# Patient Record
Sex: MALE | Race: WHITE | NOT HISPANIC OR LATINO | Employment: OTHER | URBAN - NONMETROPOLITAN AREA
[De-identification: names, ages, dates, MRNs, and addresses within clinical notes are randomized per-mention and may not be internally consistent; named-entity substitution may affect disease eponyms.]

---

## 2017-06-20 ENCOUNTER — OFFICE VISIT (OUTPATIENT)
Dept: FAMILY MEDICINE CLINIC | Facility: CLINIC | Age: 77
End: 2017-06-20

## 2017-06-20 VITALS
HEART RATE: 52 BPM | BODY MASS INDEX: 23.97 KG/M2 | WEIGHT: 186.8 LBS | SYSTOLIC BLOOD PRESSURE: 140 MMHG | DIASTOLIC BLOOD PRESSURE: 88 MMHG | HEIGHT: 74 IN | TEMPERATURE: 97.9 F | OXYGEN SATURATION: 98 %

## 2017-06-20 DIAGNOSIS — J06.9 VIRAL URI WITH COUGH: ICD-10-CM

## 2017-06-20 DIAGNOSIS — J30.2 SEASONAL ALLERGIC RHINITIS, UNSPECIFIED ALLERGIC RHINITIS TRIGGER: Chronic | ICD-10-CM

## 2017-06-20 DIAGNOSIS — R00.1 SINUS BRADYCARDIA: ICD-10-CM

## 2017-06-20 DIAGNOSIS — J45.21 MILD INTERMITTENT ASTHMA WITH ACUTE EXACERBATION: Primary | ICD-10-CM

## 2017-06-20 PROCEDURE — 96372 THER/PROPH/DIAG INJ SC/IM: CPT | Performed by: INTERNAL MEDICINE

## 2017-06-20 PROCEDURE — 99213 OFFICE O/P EST LOW 20 MIN: CPT | Performed by: INTERNAL MEDICINE

## 2017-06-20 RX ORDER — METHYLPREDNISOLONE ACETATE 80 MG/ML
80 INJECTION, SUSPENSION INTRA-ARTICULAR; INTRALESIONAL; INTRAMUSCULAR; SOFT TISSUE ONCE
Status: COMPLETED | OUTPATIENT
Start: 2017-06-20 | End: 2017-06-20

## 2017-06-20 RX ORDER — TRIAMCINOLONE ACETONIDE 40 MG/ML
10 INJECTION, SUSPENSION INTRA-ARTICULAR; INTRAMUSCULAR ONCE
Status: COMPLETED | OUTPATIENT
Start: 2017-06-20 | End: 2017-06-20

## 2017-06-20 RX ADMIN — TRIAMCINOLONE ACETONIDE 10 MG: 40 INJECTION, SUSPENSION INTRA-ARTICULAR; INTRAMUSCULAR at 15:35

## 2017-06-20 RX ADMIN — METHYLPREDNISOLONE ACETATE 80 MG: 80 INJECTION, SUSPENSION INTRA-ARTICULAR; INTRALESIONAL; INTRAMUSCULAR; SOFT TISSUE at 15:36

## 2017-06-20 NOTE — PROGRESS NOTES
Subjective       History of Present Illness     Corey Maki is a 77 y.o. male here today reporting over a 2-month history of seasonal allergies including postnasal drip and clear nasal discharge.  He then developed a sore throat and nonproductive cough approximately 1 week ago.  He also reports some right-sided nasal congestion, which started today.  The cough is not keeping him awake at night.  He denies paroxysm of cough.  He does report feeling a little tight with deep inspiration and exam today reveals diminished excursion on expiratory phase of cough, for which he is given a sample of Breo.  He denies ear pain.  He took some Clearamist initially, but is not currently taking anything.  He denies sick contacts he is aware of.       He reports he had an EKG in [place] and the doctor sent him to the ER where he was told he was having bradycardia with pulse rate was 30.  Sounds like he might have been experiencing some bigeminy.  He is physically active and his baseline heart rate is in the 50s.  He wore a Holter monitor, which he reports revealed evidence of bigeminy. He was prescribed blood pressure medication, for which he took it for a while, but then discontinued it.  He is assured blood pressure today is not significantly above goal.            Review of Systems   Constitutional: Negative for chills, fatigue and fever.   HENT: Positive for congestion, postnasal drip and sore throat. Negative for ear pain and sinus pressure.    Respiratory: Positive for cough and wheezing. Negative for shortness of breath.    Cardiovascular: Negative for chest pain, palpitations and leg swelling.   Gastrointestinal: Negative for abdominal pain, blood in stool, constipation, diarrhea, nausea and vomiting.   Endocrine: Negative for cold intolerance, heat intolerance, polydipsia and polyuria.   Genitourinary: Negative for dysuria, frequency, hematuria and urgency.   Skin: Negative for rash.   Neurological: Negative for syncope  "and weakness.      PHQ-9 Depression Screening 6/20/2017   Little interest or pleasure in doing things 0   Feeling down, depressed, or hopeless 0   Trouble falling or staying asleep, or sleeping too much 0   Feeling tired or having little energy 0   Poor appetite or overeating 0   Feeling bad about yourself - or that you are a failure or have let yourself or your family down 0   Trouble concentrating on things, such as reading the newspaper or watching television 0   Moving or speaking so slowly that other people could have noticed. Or the opposite - being so fidgety or restless that you have been moving around a lot more than usual 0   Thoughts that you would be better off dead, or of hurting yourself in some way 0   PHQ-9 Total Score 0   If you checked off any problems, how difficult have these problems made it for you to do your work, take care of things at home, or get along with other people? Not difficult at all         Objective     Vitals:    06/20/17 1456   BP: 140/88   Pulse: 52   Temp: 97.9 °F (36.6 °C)   TempSrc: Oral   SpO2: 98%   Weight: 186 lb 12.8 oz (84.7 kg)   Height: 74\" (188 cm)       Physical Exam   Constitutional: He is oriented to person, place, and time. He appears well-developed and well-nourished. No distress.   HENT:   Head: Normocephalic and atraumatic.   Nose: Nose normal.   Mouth/Throat: Oropharynx is clear and moist. No oropharyngeal exudate.   Lots of clear postnasal drip and mild erythema of posterior oropharynx. Nasal congestion, right greater than left.    Eyes: EOM are normal. Pupils are equal, round, and reactive to light.   Neck: Neck supple. No JVD present. No thyromegaly present.   Cardiovascular: Normal rate, regular rhythm and normal heart sounds.    Pulmonary/Chest: Effort normal and breath sounds normal. No accessory muscle usage. No respiratory distress. He has no wheezes. He has no rales.   Diminished excursion on expiratory phase of cough.    Abdominal: Soft. Bowel " sounds are normal. He exhibits no distension. There is no tenderness.   Musculoskeletal: He exhibits no edema.   Lymphadenopathy:     He has no cervical adenopathy.   Neurological: He is alert and oriented to person, place, and time. No cranial nerve deficit.   Psychiatric: He has a normal mood and affect. His speech is normal and behavior is normal.     Future Appointments  Date Time Provider Department Center   12/27/2017 9:00 AM Keegan Sainz MD [facility] None         Assessment/Plan      Recommended OTC Claritin (loratadine) 10 mg daily for the seasonal allergy symptoms and phenylephrine 10 mg to take one in the morning.    He is given a sample of Breo inhaler to use one inhalation daily.      After informed verbal consent, patient is given Kenalog 10 mg and Depo-Medrol 80 mg IM without difficulty or complications.  Patient tolerated well without complications.       He will return in December for next scheduled follow up with fasting labs one week prior.     Scribed for Dr. Sainz by Lorraine Patel [facility].     Diagnoses and all orders for this visit:    Mild intermittent asthma with acute exacerbation  -     triamcinolone acetonide (KENALOG-40) injection 10 mg; Inject 0.25 mL into the shoulder, thigh, or buttocks 1 (One) Time.  -     methylPREDNISolone acetate (DEPO-medrol) injection 80 mg; Inject 1 mL into the shoulder, thigh, or buttocks 1 (One) Time.    Seasonal allergic rhinitis, unspecified allergic rhinitis trigger  -     triamcinolone acetonide (KENALOG-40) injection 10 mg; Inject 0.25 mL into the shoulder, thigh, or buttocks 1 (One) Time.  -     methylPREDNISolone acetate (DEPO-medrol) injection 80 mg; Inject 1 mL into the shoulder, thigh, or buttocks 1 (One) Time.    Viral URI with cough  -     triamcinolone acetonide (KENALOG-40) injection 10 mg; Inject 0.25 mL into the shoulder, thigh, or buttocks 1 (One) Time.  -     methylPREDNISolone acetate (DEPO-medrol) injection 80 mg; Inject 1 mL into the  shoulder, thigh, or buttocks 1 (One) Time.    Sinus bradycardia    Other orders  -     Cholecalciferol (VITAMIN D3) 5000 UNITS capsule capsule; Take 5,000 Units by mouth Daily.      No visits with results within 3 Week(s) from this visit.  Latest known visit with results is:    Hospital Outpatient Visit on 12/20/2016   Component Date Value Ref Range Status   • 25 Hydroxy, Vitamin D 12/20/2016 35.1  30.0 - 100.0 ng/ml Final    Comment: INTERPRETIVE INFORMATION:  Deficient...................<20 ng/ml  Insufficient..........20-<30 ng/ml  Sufficient.............. ng/ml  Potiential Toxicity.....100 ng/ml       • PSA 12/20/2016 0.30  0.00 - 4.00 ng/ml Final    Comment: The lowest detectable amount distinguishable from 0.00 for PSA is 0.06  ng/mL.     ]

## 2017-12-18 DIAGNOSIS — M81.0 OSTEOPOROSIS, UNSPECIFIED OSTEOPOROSIS TYPE, UNSPECIFIED PATHOLOGICAL FRACTURE PRESENCE: ICD-10-CM

## 2017-12-18 DIAGNOSIS — Z12.5 SPECIAL SCREENING FOR MALIGNANT NEOPLASM OF PROSTATE: ICD-10-CM

## 2017-12-18 DIAGNOSIS — E78.5 HYPERLIPIDEMIA, UNSPECIFIED HYPERLIPIDEMIA TYPE: Primary | ICD-10-CM

## 2017-12-18 DIAGNOSIS — D69.6 DECREASED PLATELET COUNT (HCC): ICD-10-CM

## 2017-12-20 ENCOUNTER — LAB (OUTPATIENT)
Dept: LAB | Facility: OTHER | Age: 77
End: 2017-12-20

## 2017-12-20 DIAGNOSIS — E78.5 HYPERLIPIDEMIA, UNSPECIFIED HYPERLIPIDEMIA TYPE: ICD-10-CM

## 2017-12-20 DIAGNOSIS — M81.0 OSTEOPOROSIS, UNSPECIFIED OSTEOPOROSIS TYPE, UNSPECIFIED PATHOLOGICAL FRACTURE PRESENCE: Primary | ICD-10-CM

## 2017-12-20 DIAGNOSIS — M81.0 OSTEOPOROSIS, UNSPECIFIED OSTEOPOROSIS TYPE, UNSPECIFIED PATHOLOGICAL FRACTURE PRESENCE: ICD-10-CM

## 2017-12-20 DIAGNOSIS — D69.6 DECREASED PLATELET COUNT (HCC): ICD-10-CM

## 2017-12-20 DIAGNOSIS — Z12.5 SPECIAL SCREENING FOR MALIGNANT NEOPLASM OF PROSTATE: ICD-10-CM

## 2017-12-20 LAB
25(OH)D3 SERPL-MCNC: 44.4 NG/ML (ref 30–100)
ALBUMIN SERPL-MCNC: 4.1 G/DL (ref 3.2–5.5)
ALBUMIN/GLOB SERPL: 1.2 G/DL (ref 1–3)
ALP SERPL-CCNC: 74 U/L (ref 15–121)
ALT SERPL W P-5'-P-CCNC: 19 U/L (ref 10–60)
ANION GAP SERPL CALCULATED.3IONS-SCNC: 8 MMOL/L (ref 5–15)
AST SERPL-CCNC: 29 U/L (ref 10–60)
BASOPHILS # BLD AUTO: 0.02 10*3/MM3 (ref 0–0.2)
BASOPHILS NFR BLD AUTO: 0.3 % (ref 0–2)
BILIRUB SERPL-MCNC: 1.1 MG/DL (ref 0.2–1)
BUN BLD-MCNC: 24 MG/DL (ref 8–25)
BUN/CREAT SERPL: 21.8 (ref 7–25)
CALCIUM SPEC-SCNC: 9.6 MG/DL (ref 8.4–10.8)
CHLORIDE SERPL-SCNC: 102 MMOL/L (ref 100–112)
CHOLEST SERPL-MCNC: 179 MG/DL (ref 150–200)
CO2 SERPL-SCNC: 31 MMOL/L (ref 20–32)
CREAT BLD-MCNC: 1.1 MG/DL (ref 0.4–1.3)
DEPRECATED RDW RBC AUTO: 43 FL (ref 35.1–43.9)
EOSINOPHIL # BLD AUTO: 0.18 10*3/MM3 (ref 0–0.7)
EOSINOPHIL NFR BLD AUTO: 2.6 % (ref 0–7)
ERYTHROCYTE [DISTWIDTH] IN BLOOD BY AUTOMATED COUNT: 13.2 % (ref 11.5–14.5)
GFR SERPL CREATININE-BSD FRML MDRD: 65 ML/MIN/1.73 (ref 42–98)
GLOBULIN UR ELPH-MCNC: 3.5 GM/DL (ref 2.5–4.6)
GLUCOSE BLD-MCNC: 98 MG/DL (ref 70–100)
HCT VFR BLD AUTO: 43.2 % (ref 39–49)
HDLC SERPL-MCNC: 50 MG/DL (ref 35–100)
HGB BLD-MCNC: 14.5 G/DL (ref 13.7–17.3)
LDLC SERPL CALC-MCNC: 116 MG/DL
LDLC/HDLC SERPL: 2.33 {RATIO}
LYMPHOCYTES # BLD AUTO: 2.46 10*3/MM3 (ref 0.6–4.2)
LYMPHOCYTES NFR BLD AUTO: 35.4 % (ref 10–50)
MCH RBC QN AUTO: 30.5 PG (ref 26.5–34)
MCHC RBC AUTO-ENTMCNC: 33.6 G/DL (ref 31.5–36.3)
MCV RBC AUTO: 90.8 FL (ref 80–98)
MONOCYTES # BLD AUTO: 0.54 10*3/MM3 (ref 0–0.9)
MONOCYTES NFR BLD AUTO: 7.8 % (ref 0–12)
NEUTROPHILS # BLD AUTO: 3.75 10*3/MM3 (ref 2–8.6)
NEUTROPHILS NFR BLD AUTO: 53.9 % (ref 37–80)
PLATELET # BLD AUTO: 147 10*3/MM3 (ref 150–450)
PMV BLD AUTO: 12.3 FL (ref 8–12)
POTASSIUM BLD-SCNC: 3.8 MMOL/L (ref 3.4–5.4)
PROT SERPL-MCNC: 7.6 G/DL (ref 6.7–8.2)
PSA SERPL-MCNC: 0.37 NG/ML (ref 0–4)
RBC # BLD AUTO: 4.76 10*6/MM3 (ref 4.37–5.74)
SODIUM BLD-SCNC: 141 MMOL/L (ref 134–146)
TRIGL SERPL-MCNC: 63 MG/DL (ref 35–160)
VLDLC SERPL-MCNC: 12.6 MG/DL
WBC NRBC COR # BLD: 6.95 10*3/MM3 (ref 3.2–9.8)

## 2017-12-20 PROCEDURE — 84153 ASSAY OF PSA TOTAL: CPT | Performed by: INTERNAL MEDICINE

## 2017-12-20 PROCEDURE — 82306 VITAMIN D 25 HYDROXY: CPT | Performed by: INTERNAL MEDICINE

## 2017-12-20 PROCEDURE — 36415 COLL VENOUS BLD VENIPUNCTURE: CPT | Performed by: INTERNAL MEDICINE

## 2017-12-20 PROCEDURE — 85025 COMPLETE CBC W/AUTO DIFF WBC: CPT | Performed by: INTERNAL MEDICINE

## 2017-12-20 PROCEDURE — 80053 COMPREHEN METABOLIC PANEL: CPT | Performed by: INTERNAL MEDICINE

## 2017-12-20 PROCEDURE — 80061 LIPID PANEL: CPT | Performed by: INTERNAL MEDICINE

## 2017-12-27 ENCOUNTER — OFFICE VISIT (OUTPATIENT)
Dept: FAMILY MEDICINE CLINIC | Facility: CLINIC | Age: 77
End: 2017-12-27

## 2017-12-27 VITALS
HEART RATE: 60 BPM | SYSTOLIC BLOOD PRESSURE: 128 MMHG | BODY MASS INDEX: 24.13 KG/M2 | WEIGHT: 188 LBS | TEMPERATURE: 97.6 F | HEIGHT: 74 IN | DIASTOLIC BLOOD PRESSURE: 86 MMHG

## 2017-12-27 DIAGNOSIS — Z12.5 SPECIAL SCREENING FOR MALIGNANT NEOPLASM OF PROSTATE: ICD-10-CM

## 2017-12-27 DIAGNOSIS — Z12.5 SCREENING FOR MALIGNANT NEOPLASM OF PROSTATE: Chronic | ICD-10-CM

## 2017-12-27 DIAGNOSIS — M81.0 AGE-RELATED OSTEOPOROSIS WITHOUT CURRENT PATHOLOGICAL FRACTURE: Primary | Chronic | ICD-10-CM

## 2017-12-27 DIAGNOSIS — D69.6 DECREASED PLATELET COUNT (HCC): Chronic | ICD-10-CM

## 2017-12-27 DIAGNOSIS — J06.9 ACUTE URI: ICD-10-CM

## 2017-12-27 DIAGNOSIS — K21.9 GASTROESOPHAGEAL REFLUX DISEASE WITHOUT ESOPHAGITIS: Chronic | ICD-10-CM

## 2017-12-27 DIAGNOSIS — E78.2 MIXED HYPERLIPIDEMIA: Chronic | ICD-10-CM

## 2017-12-27 PROBLEM — G25.2 RESTING TREMOR: Status: ACTIVE | Noted: 2017-12-27

## 2017-12-27 PROCEDURE — 99214 OFFICE O/P EST MOD 30 MIN: CPT | Performed by: INTERNAL MEDICINE

## 2017-12-27 RX ORDER — AZITHROMYCIN 250 MG/1
TABLET, FILM COATED ORAL
Qty: 6 TABLET | Refills: 0 | Status: SHIPPED | OUTPATIENT
Start: 2017-12-27 | End: 2018-03-22

## 2017-12-27 RX ORDER — AMLODIPINE BESYLATE 5 MG/1
5 TABLET ORAL 2 TIMES DAILY
Refills: 1 | COMMUNITY
Start: 2017-11-29 | End: 2021-01-18 | Stop reason: SDUPTHER

## 2017-12-27 RX ORDER — HYDROCHLOROTHIAZIDE 12.5 MG/1
1 TABLET ORAL DAILY
Refills: 5 | COMMUNITY
Start: 2017-12-11 | End: 2018-12-19 | Stop reason: ALTCHOICE

## 2017-12-27 RX ORDER — ALENDRONATE SODIUM 70 MG/1
70 TABLET ORAL
Qty: 4 TABLET | Refills: 12 | Status: SHIPPED | OUTPATIENT
Start: 2017-12-27 | End: 2021-01-04 | Stop reason: SINTOL

## 2017-12-27 RX ORDER — OMEPRAZOLE 40 MG/1
1 CAPSULE, DELAYED RELEASE ORAL DAILY PRN
Refills: 2 | COMMUNITY
Start: 2017-11-05 | End: 2018-03-22

## 2017-12-27 NOTE — PROGRESS NOTES
Subjective        History of Present Illness     Corey Maki is a 77 y.o. male here for annual follow up on GERD, hyperlipidemia, and osteoporosis among other issues.  He resides in [place] most of the year. Repeat DEXA 12/2017 revealed increased density of 8% in lumbar spine from prior DEXA 07/2015 with persistent osteoporosis with a decrease in density of the femoral neck from prior study.  He stopped taking Fosamax in the past due to concerns of side effects.  He continues with dietary calcium and vitamin D supplements.  I encouraged him to restart the Fosamax and he agrees to this today.  He has not been taking his Prilosec, but I recommended he premedicate with Prilosec prior to taking his weekly Fosamax.          He reports upper respiratory symptoms for the past four days.  He is having an episodic nonproductive cough productive of white sputum and head congestion with thick, yellow nasal discharge as well as ear popping and pressure and scratchy throat.  He has taken NyQuil and decongestants for symptoms.  The decongestants make him a little anxious. He denies sick contacts he is aware of.          He continues to report an episodic, positional resting tremor of his bilateral wrists/hands, left more than right.  It appeared to have developed as a complication of a prior left wrist injury.   Exam reveals no cogwheeling rigidity.           He was seen by electrophysiologist in [facility] after being seen in a walk in clinic for palpitations.  His blood pressure is at goal today.      Weight is up 4 pounds in the past year. He continues with weight bearing exercise daily with walking 4-5 miles on the days he does not ride his bike when the weather doesn't allow.    The patient's relevant past medical, surgical, and social history was reviewed in Epic.   Lab results are reviewed with the patient today. CBC unremarkable. Platelets are improved at 147,000.   Calcium levels at goal.  Total cholesterol  "incresed to 179.  However, his ratio remains at goal. HDL 50.  .  Triglycerides 63.    Vitamin D at goal with current daily supplement.   PSA normal.  Liver and renal function normal.       Review of Systems   Constitutional: Negative for chills, fatigue and fever.   HENT: Positive for congestion and ear pain. Negative for sinus pressure and sore throat.    Respiratory: Positive for cough. Negative for shortness of breath and wheezing.    Cardiovascular: Negative for chest pain, palpitations and leg swelling.   Gastrointestinal: Negative for abdominal pain, blood in stool, constipation, diarrhea, nausea and vomiting.   Endocrine: Negative for cold intolerance, heat intolerance, polydipsia and polyuria.   Genitourinary: Negative for dysuria, frequency, hematuria and urgency.   Skin: Negative for rash.   Neurological: Negative for syncope and weakness.        Objective     Vitals:    12/27/17 0851   BP: 128/86   Pulse: 60   Temp: 97.6 °F (36.4 °C)   TempSrc: Oral   Weight: 85.3 kg (188 lb)   Height: 188 cm (74\")     Physical Exam   Constitutional: He is oriented to person, place, and time. He appears well-developed and well-nourished. No distress.   Very pleasant gentleman.  He is very fit.  He appears younger than his stated age.    HENT:   Head: Normocephalic and atraumatic.   Nose: Right sinus exhibits no maxillary sinus tenderness and no frontal sinus tenderness. Left sinus exhibits no maxillary sinus tenderness and no frontal sinus tenderness.   Mouth/Throat: Uvula is midline, oropharynx is clear and moist and mucous membranes are normal. No oral lesions. No tonsillar exudate.   Nasal congestion, but no sinus pain.     Eyes: Conjunctivae and EOM are normal. Pupils are equal, round, and reactive to light.   Neck: Trachea normal. Neck supple. No JVD present. Carotid bruit is not present. No tracheal deviation present. No thyroid mass and no thyromegaly present.   Cardiovascular: Normal rate, regular rhythm " and normal heart sounds.   No extrasystoles are present. PMI is not displaced.    No murmur heard.  Pulmonary/Chest: Effort normal and breath sounds normal. No accessory muscle usage. No respiratory distress. He has no decreased breath sounds. He has no wheezes. He has no rhonchi. He has no rales.   Abdominal: Soft. Bowel sounds are normal. He exhibits no distension. There is no hepatosplenomegaly. There is no tenderness.       Vascular Status -  His exam exhibits right foot vasculature normal. His exam exhibits no right foot edema. His exam exhibits left foot vasculature normal. His exam exhibits no left foot edema.  Lymphadenopathy:     He has no cervical adenopathy.   Neurological: He is alert and oriented to person, place, and time. No cranial nerve deficit. Coordination normal.   He has a resting tremor of bilateral upper extremities, but no cogwheeling rigidity.      Skin: Skin is warm, dry and intact. No rash noted. No cyanosis. Nails show no clubbing.   Psychiatric: He has a normal mood and affect. His speech is normal and behavior is normal. Thought content normal.   Vitals reviewed.      Assessment/Plan       Mucinex D to take 1/2 tablet each morning and Z-pack as directed.      Restart Fosamax 70 mg weekly.  I encouraged him to premedicate with Prilosec the evening prior.   We will repeat a DEXA in two years for age related osteoporosis.  Continue with regular weight bearing exercise, dietary calcium, and daily vitamin D supplement.     Continue other medications and vitamin and mineral supplements to treat additional medical problems which we addressed today.  He will return in one year for annual exam with fasting labs one week prior.      Scribed for Dr. Sainz by Lorraine Patel [facility].     Diagnoses and all orders for this visit:    Age-related osteoporosis without current pathological fracture  -     Vitamin D 25 Hydroxy; Future    Decreased platelet count    Screening for malignant neoplasm of  prostate    Mixed hyperlipidemia  -     CBC Auto Differential; Future  -     Comprehensive Metabolic Panel; Future  -     Lipid Panel; Future  -     Vitamin D 25 Hydroxy; Future  -     PSA Screen; Future  -     TSH; Future    Gastroesophageal reflux disease without esophagitis    Special screening for malignant neoplasm of prostate  -     PSA Screen; Future    Acute URI    Other orders  -     amLODIPine (NORVASC) 5 MG tablet; Take 1 tablet by mouth Daily.  -     hydrochlorothiazide (HYDRODIURIL) 12.5 MG tablet; Take 1 tablet by mouth Daily.  -     omeprazole (priLOSEC) 40 MG capsule; Take 1 capsule by mouth Daily As Needed.  -     alendronate (FOSAMAX) 70 MG tablet; Take 1 tablet by mouth Every 7 (Seven) Days.  -     azithromycin (ZITHROMAX) 250 MG tablet; Take 2 tablets the first day, then 1 tablet daily for 4 days.        Lab on 12/20/2017   Component Date Value Ref Range Status   • Glucose 12/20/2017 98  70 - 100 mg/dL Final   • BUN 12/20/2017 24  8 - 25 mg/dL Final   • Creatinine 12/20/2017 1.10  0.40 - 1.30 mg/dL Final   • Sodium 12/20/2017 141  134 - 146 mmol/L Final   • Potassium 12/20/2017 3.8  3.4 - 5.4 mmol/L Final   • Chloride 12/20/2017 102  100 - 112 mmol/L Final   • CO2 12/20/2017 31.0  20.0 - 32.0 mmol/L Final   • Calcium 12/20/2017 9.6  8.4 - 10.8 mg/dL Final   • Total Protein 12/20/2017 7.6  6.7 - 8.2 g/dL Final   • Albumin 12/20/2017 4.10  3.20 - 5.50 g/dL Final   • ALT (SGPT) 12/20/2017 19  10 - 60 U/L Final   • AST (SGOT) 12/20/2017 29  10 - 60 U/L Final   • Alkaline Phosphatase 12/20/2017 74  15 - 121 U/L Final   • Total Bilirubin 12/20/2017 1.1* 0.2 - 1.0 mg/dL Final   • eGFR Non  Amer 12/20/2017 65  42 - 98 mL/min/1.73 Final   • Globulin 12/20/2017 3.5  2.5 - 4.6 gm/dL Final   • A/G Ratio 12/20/2017 1.2  1.0 - 3.0 g/dL Final   • BUN/Creatinine Ratio 12/20/2017 21.8  7.0 - 25.0 Final   • Anion Gap 12/20/2017 8.0  5.0 - 15.0 mmol/L Final   • Total Cholesterol 12/20/2017 179  150 - 200  mg/dL Final   • Triglycerides 12/20/2017 63  35 - 160 mg/dL Final   • HDL Cholesterol 12/20/2017 50  35 - 100 mg/dL Final   • LDL Cholesterol  12/20/2017 116  mg/dL Final   • VLDL Cholesterol 12/20/2017 12.6  mg/dL Final   • LDL/HDL Ratio 12/20/2017 2.33   Final   • PSA 12/20/2017 0.372  0.000 - 4.000 ng/mL Final   • 25 Hydroxy, Vitamin D 12/20/2017 44.4  30.0 - 100.0 ng/ml Final   • WBC 12/20/2017 6.95  3.20 - 9.80 10*3/mm3 Final   • RBC 12/20/2017 4.76  4.37 - 5.74 10*6/mm3 Final   • Hemoglobin 12/20/2017 14.5  13.7 - 17.3 g/dL Final   • Hematocrit 12/20/2017 43.2  39.0 - 49.0 % Final   • MCV 12/20/2017 90.8  80.0 - 98.0 fL Final   • MCH 12/20/2017 30.5  26.5 - 34.0 pg Final   • MCHC 12/20/2017 33.6  31.5 - 36.3 g/dL Final   • RDW 12/20/2017 13.2  11.5 - 14.5 % Final   • RDW-SD 12/20/2017 43.0  35.1 - 43.9 fl Final   • MPV 12/20/2017 12.3* 8.0 - 12.0 fL Final   • Platelets 12/20/2017 147* 150 - 450 10*3/mm3 Final   • Neutrophil % 12/20/2017 53.9  37.0 - 80.0 % Final   • Lymphocyte % 12/20/2017 35.4  10.0 - 50.0 % Final   • Monocyte % 12/20/2017 7.8  0.0 - 12.0 % Final   • Eosinophil % 12/20/2017 2.6  0.0 - 7.0 % Final   • Basophil % 12/20/2017 0.3  0.0 - 2.0 % Final   • Neutrophils, Absolute 12/20/2017 3.75  2.00 - 8.60 10*3/mm3 Final   • Lymphocytes, Absolute 12/20/2017 2.46  0.60 - 4.20 10*3/mm3 Final   • Monocytes, Absolute 12/20/2017 0.54  0.00 - 0.90 10*3/mm3 Final   • Eosinophils, Absolute 12/20/2017 0.18  0.00 - 0.70 10*3/mm3 Final   • Basophils, Absolute 12/20/2017 0.02  0.00 - 0.20 10*3/mm3 Final   ]

## 2018-03-22 ENCOUNTER — OFFICE VISIT (OUTPATIENT)
Dept: FAMILY MEDICINE CLINIC | Facility: CLINIC | Age: 78
End: 2018-03-22

## 2018-03-22 VITALS
OXYGEN SATURATION: 98 % | HEIGHT: 74 IN | DIASTOLIC BLOOD PRESSURE: 78 MMHG | WEIGHT: 182 LBS | BODY MASS INDEX: 23.36 KG/M2 | SYSTOLIC BLOOD PRESSURE: 120 MMHG | TEMPERATURE: 97.7 F | HEART RATE: 55 BPM

## 2018-03-22 DIAGNOSIS — R05.9 COUGH: Primary | ICD-10-CM

## 2018-03-22 DIAGNOSIS — J06.9 VIRAL URI WITH COUGH: ICD-10-CM

## 2018-03-22 DIAGNOSIS — I10 ESSENTIAL HYPERTENSION: Chronic | ICD-10-CM

## 2018-03-22 DIAGNOSIS — M81.0 AGE-RELATED OSTEOPOROSIS WITHOUT CURRENT PATHOLOGICAL FRACTURE: Chronic | ICD-10-CM

## 2018-03-22 DIAGNOSIS — J06.9 ACUTE URI: ICD-10-CM

## 2018-03-22 DIAGNOSIS — J30.1 ACUTE SEASONAL ALLERGIC RHINITIS DUE TO POLLEN: ICD-10-CM

## 2018-03-22 LAB
FLUAV AG NPH QL: NEGATIVE
FLUBV AG NPH QL IA: NEGATIVE

## 2018-03-22 PROCEDURE — 87804 INFLUENZA ASSAY W/OPTIC: CPT | Performed by: INTERNAL MEDICINE

## 2018-03-22 PROCEDURE — 96372 THER/PROPH/DIAG INJ SC/IM: CPT | Performed by: INTERNAL MEDICINE

## 2018-03-22 PROCEDURE — 99213 OFFICE O/P EST LOW 20 MIN: CPT | Performed by: INTERNAL MEDICINE

## 2018-03-22 RX ORDER — METHYLPREDNISOLONE ACETATE 80 MG/ML
80 INJECTION, SUSPENSION INTRA-ARTICULAR; INTRALESIONAL; INTRAMUSCULAR; SOFT TISSUE ONCE
Status: COMPLETED | OUTPATIENT
Start: 2018-03-22 | End: 2018-03-22

## 2018-03-22 RX ORDER — TRIAMCINOLONE ACETONIDE 40 MG/ML
20 INJECTION, SUSPENSION INTRA-ARTICULAR; INTRAMUSCULAR ONCE
Status: COMPLETED | OUTPATIENT
Start: 2018-03-22 | End: 2018-03-22

## 2018-03-22 RX ADMIN — TRIAMCINOLONE ACETONIDE 20 MG: 40 INJECTION, SUSPENSION INTRA-ARTICULAR; INTRAMUSCULAR at 11:59

## 2018-03-22 RX ADMIN — METHYLPREDNISOLONE ACETATE 80 MG: 80 INJECTION, SUSPENSION INTRA-ARTICULAR; INTRALESIONAL; INTRAMUSCULAR; SOFT TISSUE at 11:59

## 2018-03-23 PROBLEM — I10 ESSENTIAL HYPERTENSION: Chronic | Status: ACTIVE | Noted: 2018-03-23

## 2018-03-23 NOTE — PROGRESS NOTES
"Subjective     Corey Maki is a 77 y.o. male.     History of Present Illness     Corey reports a 2 day history of nasal congestion, clear rhinitis, and increased postnasal drip.  He is also experiencing a nonproductive cough with rare wheeze.  He denies any shortness of breath.  He is describing some spring allergy symptoms including sneezing and rhinitis.  Mild to moderate sore throat.  No fevers or chills.  He denies significant headaches or flulike body aches and pains.  Influenza screen is negative today.  No sick contacts.  He has not tried any OTC medicines for her symptoms yet.  He has Zyrtec at home.    His blood pressure continues to be improved with a combination of Norvasc and hydrochlorothiazide.  See last note.    I'm pleased to see that he is being compliant with the Fosamax and calcium/vitamin D supplements to address his osteoporosis.  See last note for more details.    He continues to exercise regularly.  He is mostly walking for exercise now instead of biking.  He walks for approximately 1 hour at least 5 days weekly.  I suggested he consider interval training.    Review of Systems   Constitutional: Negative for chills, fatigue and fever.   HENT: Positive for congestion, postnasal drip, rhinorrhea and sore throat. Negative for ear pain and sinus pressure.    Respiratory: Negative for cough and shortness of breath.    Cardiovascular: Negative for chest pain, palpitations and leg swelling.   Gastrointestinal: Negative for abdominal pain, blood in stool, constipation, diarrhea, nausea and vomiting.   Endocrine: Negative for cold intolerance, heat intolerance, polydipsia and polyuria.   Genitourinary: Negative for dysuria, frequency, hematuria and urgency.   Skin: Negative for rash.   Neurological: Negative for syncope and weakness.       Objective     /78   Pulse 55   Temp 97.7 °F (36.5 °C) (Oral)   Ht 188 cm (74\")   Wt 82.6 kg (182 lb)   SpO2 98%   BMI 23.37 kg/m²     Physical Exam "   Constitutional: He is oriented to person, place, and time. He appears well-developed and well-nourished. No distress.   Very pleasant gentleman.  He is very fit.  He appears younger than his stated age.    HENT:   Head: Normocephalic and atraumatic.   Nose: Right sinus exhibits no maxillary sinus tenderness and no frontal sinus tenderness. Left sinus exhibits no maxillary sinus tenderness and no frontal sinus tenderness.   Mouth/Throat: Uvula is midline, oropharynx is clear and moist and mucous membranes are normal. No oral lesions. No tonsillar exudate.   Nasal congestion, clear postnasal drip.  Mild posterior erythema on the right with no exudate noted.   Eyes: Conjunctivae and EOM are normal. Pupils are equal, round, and reactive to light.   Neck: Trachea normal. Neck supple. No JVD present. Carotid bruit is not present. No tracheal deviation present. No thyroid mass and no thyromegaly present.   Cardiovascular: Normal rate, regular rhythm, normal heart sounds and intact distal pulses.   No extrasystoles are present. PMI is not displaced.    No murmur heard.  Pulmonary/Chest: Effort normal. No accessory muscle usage. No respiratory distress. He has no decreased breath sounds. He has no wheezes. He has no rhonchi. He has no rales.   Just a few rhonchi noted on expiratory phase of cough bilaterally with mildly diminished excursion on expiratory phase of cough   Abdominal: Soft. Bowel sounds are normal. He exhibits no distension. There is no hepatosplenomegaly. There is no tenderness.     Vascular Status -  His right foot exhibits normal right foot vasculature  and normal right foot edema. His left foot exhibits normal left foot vasculature  and normal left foot edema.  Lymphadenopathy:     He has no cervical adenopathy.   Neurological: He is alert and oriented to person, place, and time. No cranial nerve deficit. Coordination normal.   He has a resting tremor of bilateral upper extremities, but no cogwheeling  rigidity.      Skin: Skin is warm, dry and intact. No rash noted. No cyanosis. Nails show no clubbing.   Psychiatric: He has a normal mood and affect. His speech is normal and behavior is normal. Thought content normal.   Vitals reviewed.      PHQ-2/PHQ-9 Depression Screening 12/27/2017   Little interest or pleasure in doing things 0   Feeling down, depressed, or hopeless 0   Trouble falling or staying asleep, or sleeping too much -   Feeling tired or having little energy -   Poor appetite or overeating -   Feeling bad about yourself - or that you are a failure or have let yourself or your family down -   Trouble concentrating on things, such as reading the newspaper or watching television -   Moving or speaking so slowly that other people could have noticed. Or the opposite - being so fidgety or restless that you have been moving around a lot more than usual -   Thoughts that you would be better off dead, or of hurting yourself in some way -   Total Score 0   If you checked off any problems, how difficult have these problems made it for you to do your work, take care of things at home, or get along with other people? -       Assessment/Plan     Z-Hayder as directed.  Resume Zyrtec 10 mg daily at bedtime.  The patient receives injection today with Kenalog 20 mg and Depo-Medrol 80 mg IM without difficulty or complication.    Continue the Norvasc and HCTZ.    Continue the Fosamax and calcium/vitamin D supplement.  Continue the regular weightbearing exercises.    Diagnoses and all orders for this visit:    Cough  -     Influenza Antigen, Rapid - Swab, Nasopharynx  -     triamcinolone acetonide (KENALOG-40) injection 20 mg; Inject 0.5 mL into the shoulder, thigh, or buttocks 1 (One) Time.  -     methylPREDNISolone acetate (DEPO-medrol) injection 80 mg; Inject 1 mL into the shoulder, thigh, or buttocks 1 (One) Time.    Acute URI  -     triamcinolone acetonide (KENALOG-40) injection 20 mg; Inject 0.5 mL into the shoulder,  thigh, or buttocks 1 (One) Time.  -     methylPREDNISolone acetate (DEPO-medrol) injection 80 mg; Inject 1 mL into the shoulder, thigh, or buttocks 1 (One) Time.    Viral URI with cough  -     triamcinolone acetonide (KENALOG-40) injection 20 mg; Inject 0.5 mL into the shoulder, thigh, or buttocks 1 (One) Time.  -     methylPREDNISolone acetate (DEPO-medrol) injection 80 mg; Inject 1 mL into the shoulder, thigh, or buttocks 1 (One) Time.    Acute seasonal allergic rhinitis due to pollen  -     triamcinolone acetonide (KENALOG-40) injection 20 mg; Inject 0.5 mL into the shoulder, thigh, or buttocks 1 (One) Time.  -     methylPREDNISolone acetate (DEPO-medrol) injection 80 mg; Inject 1 mL into the shoulder, thigh, or buttocks 1 (One) Time.    Age-related osteoporosis without current pathological fracture - started Fosamax 12/2017    Essential hypertension        No visits with results within 3 Week(s) from this visit.   Latest known visit with results is:   Lab on 12/20/2017   Component Date Value Ref Range Status   • Glucose 12/20/2017 98  70 - 100 mg/dL Final   • BUN 12/20/2017 24  8 - 25 mg/dL Final   • Creatinine 12/20/2017 1.10  0.40 - 1.30 mg/dL Final   • Sodium 12/20/2017 141  134 - 146 mmol/L Final   • Potassium 12/20/2017 3.8  3.4 - 5.4 mmol/L Final   • Chloride 12/20/2017 102  100 - 112 mmol/L Final   • CO2 12/20/2017 31.0  20.0 - 32.0 mmol/L Final   • Calcium 12/20/2017 9.6  8.4 - 10.8 mg/dL Final   • Total Protein 12/20/2017 7.6  6.7 - 8.2 g/dL Final   • Albumin 12/20/2017 4.10  3.20 - 5.50 g/dL Final   • ALT (SGPT) 12/20/2017 19  10 - 60 U/L Final   • AST (SGOT) 12/20/2017 29  10 - 60 U/L Final   • Alkaline Phosphatase 12/20/2017 74  15 - 121 U/L Final   • Total Bilirubin 12/20/2017 1.1* 0.2 - 1.0 mg/dL Final   • eGFR Non  Amer 12/20/2017 65  42 - 98 mL/min/1.73 Final   • Globulin 12/20/2017 3.5  2.5 - 4.6 gm/dL Final   • A/G Ratio 12/20/2017 1.2  1.0 - 3.0 g/dL Final   • BUN/Creatinine Ratio  12/20/2017 21.8  7.0 - 25.0 Final   • Anion Gap 12/20/2017 8.0  5.0 - 15.0 mmol/L Final   • Total Cholesterol 12/20/2017 179  150 - 200 mg/dL Final   • Triglycerides 12/20/2017 63  35 - 160 mg/dL Final   • HDL Cholesterol 12/20/2017 50  35 - 100 mg/dL Final   • LDL Cholesterol  12/20/2017 116  mg/dL Final   • VLDL Cholesterol 12/20/2017 12.6  mg/dL Final   • LDL/HDL Ratio 12/20/2017 2.33   Final   • PSA 12/20/2017 0.372  0.000 - 4.000 ng/mL Final   • 25 Hydroxy, Vitamin D 12/20/2017 44.4  30.0 - 100.0 ng/ml Final   • WBC 12/20/2017 6.95  3.20 - 9.80 10*3/mm3 Final   • RBC 12/20/2017 4.76  4.37 - 5.74 10*6/mm3 Final   • Hemoglobin 12/20/2017 14.5  13.7 - 17.3 g/dL Final   • Hematocrit 12/20/2017 43.2  39.0 - 49.0 % Final   • MCV 12/20/2017 90.8  80.0 - 98.0 fL Final   • MCH 12/20/2017 30.5  26.5 - 34.0 pg Final   • MCHC 12/20/2017 33.6  31.5 - 36.3 g/dL Final   • RDW 12/20/2017 13.2  11.5 - 14.5 % Final   • RDW-SD 12/20/2017 43.0  35.1 - 43.9 fl Final   • MPV 12/20/2017 12.3* 8.0 - 12.0 fL Final   • Platelets 12/20/2017 147* 150 - 450 10*3/mm3 Final   • Neutrophil % 12/20/2017 53.9  37.0 - 80.0 % Final   • Lymphocyte % 12/20/2017 35.4  10.0 - 50.0 % Final   • Monocyte % 12/20/2017 7.8  0.0 - 12.0 % Final   • Eosinophil % 12/20/2017 2.6  0.0 - 7.0 % Final   • Basophil % 12/20/2017 0.3  0.0 - 2.0 % Final   • Neutrophils, Absolute 12/20/2017 3.75  2.00 - 8.60 10*3/mm3 Final   • Lymphocytes, Absolute 12/20/2017 2.46  0.60 - 4.20 10*3/mm3 Final   • Monocytes, Absolute 12/20/2017 0.54  0.00 - 0.90 10*3/mm3 Final   • Eosinophils, Absolute 12/20/2017 0.18  0.00 - 0.70 10*3/mm3 Final   • Basophils, Absolute 12/20/2017 0.02  0.00 - 0.20 10*3/mm3 Final   ]

## 2018-12-19 ENCOUNTER — LAB (OUTPATIENT)
Dept: LAB | Facility: OTHER | Age: 78
End: 2018-12-19

## 2018-12-19 ENCOUNTER — OFFICE VISIT (OUTPATIENT)
Dept: FAMILY MEDICINE CLINIC | Facility: CLINIC | Age: 78
End: 2018-12-19

## 2018-12-19 VITALS
HEIGHT: 74 IN | TEMPERATURE: 97.7 F | SYSTOLIC BLOOD PRESSURE: 138 MMHG | DIASTOLIC BLOOD PRESSURE: 80 MMHG | BODY MASS INDEX: 23.1 KG/M2 | HEART RATE: 52 BPM | WEIGHT: 180 LBS

## 2018-12-19 DIAGNOSIS — K21.9 GASTROESOPHAGEAL REFLUX DISEASE WITHOUT ESOPHAGITIS: Chronic | ICD-10-CM

## 2018-12-19 DIAGNOSIS — Z12.5 SCREENING FOR MALIGNANT NEOPLASM OF PROSTATE: Chronic | ICD-10-CM

## 2018-12-19 DIAGNOSIS — M81.0 AGE-RELATED OSTEOPOROSIS WITHOUT CURRENT PATHOLOGICAL FRACTURE: Chronic | ICD-10-CM

## 2018-12-19 DIAGNOSIS — I10 ESSENTIAL HYPERTENSION: Primary | Chronic | ICD-10-CM

## 2018-12-19 DIAGNOSIS — E78.2 MIXED HYPERLIPIDEMIA: Chronic | ICD-10-CM

## 2018-12-19 DIAGNOSIS — R73.01 IFG (IMPAIRED FASTING GLUCOSE): ICD-10-CM

## 2018-12-19 DIAGNOSIS — J30.2 SEASONAL ALLERGIC RHINITIS, UNSPECIFIED TRIGGER: Chronic | ICD-10-CM

## 2018-12-19 LAB
25(OH)D3 SERPL-MCNC: 64.4 NG/ML (ref 30–100)
ALBUMIN SERPL-MCNC: 5 G/DL (ref 3.5–5)
ALBUMIN/GLOB SERPL: 1.6 G/DL (ref 1.1–1.8)
ALP SERPL-CCNC: 102 U/L (ref 38–126)
ALT SERPL W P-5'-P-CCNC: 18 U/L
ANION GAP SERPL CALCULATED.3IONS-SCNC: 9 MMOL/L (ref 5–15)
AST SERPL-CCNC: 31 U/L (ref 17–59)
BASOPHILS # BLD AUTO: 0.01 10*3/MM3 (ref 0–0.2)
BASOPHILS NFR BLD AUTO: 0.2 % (ref 0–2)
BILIRUB SERPL-MCNC: 1 MG/DL (ref 0.2–1.3)
BUN BLD-MCNC: 18 MG/DL (ref 9–20)
BUN/CREAT SERPL: 15.9 (ref 7–25)
CALCIUM SPEC-SCNC: 10 MG/DL (ref 8.4–10.2)
CHLORIDE SERPL-SCNC: 106 MMOL/L (ref 98–107)
CHOLEST SERPL-MCNC: 199 MG/DL (ref 150–200)
CO2 SERPL-SCNC: 27 MMOL/L (ref 22–30)
CREAT BLD-MCNC: 1.13 MG/DL (ref 0.66–1.25)
DEPRECATED RDW RBC AUTO: 45.5 FL (ref 35.1–43.9)
EOSINOPHIL # BLD AUTO: 0.2 10*3/MM3 (ref 0–0.7)
EOSINOPHIL NFR BLD AUTO: 3.4 % (ref 0–7)
ERYTHROCYTE [DISTWIDTH] IN BLOOD BY AUTOMATED COUNT: 13.9 % (ref 11.5–14.5)
GFR SERPL CREATININE-BSD FRML MDRD: 63 ML/MIN/1.73 (ref 42–98)
GLOBULIN UR ELPH-MCNC: 3.2 GM/DL (ref 2.3–3.5)
GLUCOSE BLD-MCNC: 103 MG/DL (ref 74–99)
HCT VFR BLD AUTO: 45.4 % (ref 39–49)
HDLC SERPL-MCNC: 54 MG/DL (ref 40–59)
HGB BLD-MCNC: 14.9 G/DL (ref 13.7–17.3)
LDLC SERPL CALC-MCNC: 130 MG/DL
LDLC/HDLC SERPL: 2.41 {RATIO} (ref 0–3.55)
LYMPHOCYTES # BLD AUTO: 2.18 10*3/MM3 (ref 0.6–4.2)
LYMPHOCYTES NFR BLD AUTO: 36.8 % (ref 10–50)
MCH RBC QN AUTO: 30 PG (ref 26.5–34)
MCHC RBC AUTO-ENTMCNC: 32.8 G/DL (ref 31.5–36.3)
MCV RBC AUTO: 91.3 FL (ref 80–98)
MONOCYTES # BLD AUTO: 0.45 10*3/MM3 (ref 0–0.9)
MONOCYTES NFR BLD AUTO: 7.6 % (ref 0–12)
NEUTROPHILS # BLD AUTO: 3.08 10*3/MM3 (ref 2–8.6)
NEUTROPHILS NFR BLD AUTO: 52 % (ref 37–80)
PLATELET # BLD AUTO: 164 10*3/MM3 (ref 150–450)
PMV BLD AUTO: 12.3 FL (ref 8–12)
POTASSIUM BLD-SCNC: 4.2 MMOL/L (ref 3.4–5)
PROT SERPL-MCNC: 8.2 G/DL (ref 6.3–8.2)
RBC # BLD AUTO: 4.97 10*6/MM3 (ref 4.37–5.74)
SODIUM BLD-SCNC: 142 MMOL/L (ref 137–145)
TRIGL SERPL-MCNC: 73 MG/DL
TSH SERPL DL<=0.05 MIU/L-ACNC: 1.71 MIU/ML (ref 0.46–4.68)
VLDLC SERPL-MCNC: 14.6 MG/DL
WBC NRBC COR # BLD: 5.92 10*3/MM3 (ref 3.2–9.8)

## 2018-12-19 PROCEDURE — 85025 COMPLETE CBC W/AUTO DIFF WBC: CPT | Performed by: INTERNAL MEDICINE

## 2018-12-19 PROCEDURE — 82306 VITAMIN D 25 HYDROXY: CPT | Performed by: INTERNAL MEDICINE

## 2018-12-19 PROCEDURE — 99214 OFFICE O/P EST MOD 30 MIN: CPT | Performed by: INTERNAL MEDICINE

## 2018-12-19 PROCEDURE — 36415 COLL VENOUS BLD VENIPUNCTURE: CPT | Performed by: INTERNAL MEDICINE

## 2018-12-19 PROCEDURE — 80053 COMPREHEN METABOLIC PANEL: CPT | Performed by: INTERNAL MEDICINE

## 2018-12-19 PROCEDURE — 84443 ASSAY THYROID STIM HORMONE: CPT | Performed by: INTERNAL MEDICINE

## 2018-12-19 PROCEDURE — 80061 LIPID PANEL: CPT | Performed by: INTERNAL MEDICINE

## 2018-12-19 NOTE — PATIENT INSTRUCTIONS

## 2018-12-19 NOTE — PROGRESS NOTES
Subjective     History of Present Illness     Corey Maki is a 78 y.o. male who presents for annual follow up on GERD, hyperlipidemia, and osteoporosis among other issues.  He resides in [place] most of the year. His wife had a bad biking accident while exercising, which has left her disabled. He continues with weight bearing exercise daily with walking 4-5 miles on the days he does not ride his bike when the weather doesn't allow.  He had normal stress test done this past summer due to PVCs.  He follows with an electrophysiologist in [place].     He had repeat DEXA 12/2017, which revealed increased density of 8% in lumbar spine from prior DEXA 07/2015 with persistent osteoporosis with a decrease in density of the femoral neck from prior study.  When he was here last year, he had stopped taking Fosamax due to concerns of side effects. He agreed to restart the Fosamax last year, but reports he never restarted the Fosamax.  He continues with calcium and vitamin D supplements.  We will repeat DEXA 12/2019.    He reports bilateral lateral hip pain, left greater than right, which is not aggravated by walking.      His last colonoscopy was in 2007.  He denies history of colon polyps or any bowel changes.      Labs reveal mild impaired fasting glucose, for which I recommended he monitor and moderate intake of sugar and carbohydrates.        He continues to decline  pneumonia and influenza vaccines.     Weight is down 8 pounds in the past year.  Blood pressure is a little higher than his normal, but remains normal range.  He reports blood pressure is consistently at goal at home when he monitors.       It was a day early for insurance to cover annual PSA. He is going to return in a couple of days to have the PSA.  We will notify him with results.  Prostate exam is deferred today.       The patient's relevant past medical, surgical, and social history was reviewed in Epic.  Lab results are reviewed with the patient today.   "CBC unremarkable.  Platelets 164,000. Fasting glucose 103.  Total cholesterol slightly increased at 199, but remains at goal.  HDL 54.  .  Triglycerides 73.  LDL/HDL ratio remains stable at 2.41.   Normal renal and liver function normal.      Review of Systems   Constitutional: Negative for chills, fatigue and fever.   HENT: Negative for congestion, ear pain, postnasal drip, sinus pressure and sore throat.    Respiratory: Negative for cough, shortness of breath and wheezing.    Cardiovascular: Negative for chest pain, palpitations and leg swelling.   Gastrointestinal: Negative for abdominal pain, blood in stool, constipation, diarrhea, nausea and vomiting.   Endocrine: Negative for cold intolerance, heat intolerance, polydipsia and polyuria.   Genitourinary: Negative for dysuria, frequency, hematuria and urgency.   Skin: Negative for rash.   Neurological: Negative for syncope and weakness.        Objective     Vitals:    12/19/18 0845   BP: 138/80   Pulse: 52   Temp: 97.7 °F (36.5 °C)   TempSrc: Oral   Weight: 81.6 kg (180 lb)   Height: 188 cm (74\")     Physical Exam   Constitutional: He is oriented to person, place, and time. He appears well-developed and well-nourished. No distress.   HENT:   Head: Normocephalic and atraumatic.   Nose: Right sinus exhibits no maxillary sinus tenderness and no frontal sinus tenderness. Left sinus exhibits no maxillary sinus tenderness and no frontal sinus tenderness.   Mouth/Throat: Uvula is midline, oropharynx is clear and moist and mucous membranes are normal. No oral lesions. No tonsillar exudate.   Clear postnasal drip.    Eyes: Conjunctivae and EOM are normal. Pupils are equal, round, and reactive to light.   Neck: Trachea normal. Neck supple. No JVD present. Carotid bruit is not present. No tracheal deviation present. No thyroid mass and no thyromegaly present.   Cardiovascular: Normal rate, regular rhythm, normal heart sounds and intact distal pulses.  No " extrasystoles are present. PMI is not displaced.   No murmur heard.  Pulmonary/Chest: Effort normal and breath sounds normal. No accessory muscle usage. No respiratory distress. He has no decreased breath sounds. He has no wheezes. He has no rhonchi. He has no rales.   Abdominal: Soft. Bowel sounds are normal. He exhibits no distension. There is no hepatosplenomegaly. There is no tenderness.     Vascular Status -  His right foot exhibits normal foot vasculature  and no edema. His left foot exhibits normal foot vasculature  and no edema.  Lymphadenopathy:     He has no cervical adenopathy.   Neurological: He is alert and oriented to person, place, and time. No cranial nerve deficit. Coordination normal.   Skin: Skin is warm, dry and intact. No rash noted. No cyanosis. Nails show no clubbing.   Sun damaged skin.    Psychiatric: He has a normal mood and affect. His speech is normal and behavior is normal. Judgment and thought content normal.   Vitals reviewed.          Assessment/Plan      Continue the Norvasc and HCTZ.     Continue the calcium/vitamin D supplement.  Continue the regular weightbearing exercises.  We will plan to repeat DEXA 12/2019.    He declines pneumonia and influenza vaccines.     Monitor and moderate intake of sugar and carbohydrates to delay progression of impaired fasting glucose.       Continue other medications and vitamin and mineral supplements to treat additional medical problems which we addressed today.  Return in one year for annual exam with fasting labs one week prior.      Scribed for Dr. Sainz by Lorraine Patel [facility].     Diagnoses and all orders for this visit:    Essential hypertension    Mixed hyperlipidemia  -     Lipid Panel; Future    Gastroesophageal reflux disease without esophagitis    Seasonal allergic rhinitis, unspecified trigger    Age-related osteoporosis without current pathological fracture  -     Vitamin D 25 Hydroxy; Future    Screening for malignant neoplasm of  prostate  -     PSA Screen; Future    IFG (impaired fasting glucose)  -     CBC Auto Differential; Future  -     Comprehensive Metabolic Panel; Future  -     Hemoglobin A1c; Future        Lab on 12/19/2018   Component Date Value Ref Range Status   • WBC 12/19/2018 5.92  3.20 - 9.80 10*3/mm3 Final   • RBC 12/19/2018 4.97  4.37 - 5.74 10*6/mm3 Final   • Hemoglobin 12/19/2018 14.9  13.7 - 17.3 g/dL Final   • Hematocrit 12/19/2018 45.4  39.0 - 49.0 % Final   • MCV 12/19/2018 91.3  80.0 - 98.0 fL Final   • MCH 12/19/2018 30.0  26.5 - 34.0 pg Final   • MCHC 12/19/2018 32.8  31.5 - 36.3 g/dL Final   • RDW 12/19/2018 13.9  11.5 - 14.5 % Final   • RDW-SD 12/19/2018 45.5* 35.1 - 43.9 fl Final   • MPV 12/19/2018 12.3* 8.0 - 12.0 fL Final   • Platelets 12/19/2018 164  150 - 450 10*3/mm3 Final   • Neutrophil % 12/19/2018 52.0  37.0 - 80.0 % Final   • Lymphocyte % 12/19/2018 36.8  10.0 - 50.0 % Final   • Monocyte % 12/19/2018 7.6  0.0 - 12.0 % Final   • Eosinophil % 12/19/2018 3.4  0.0 - 7.0 % Final   • Basophil % 12/19/2018 0.2  0.0 - 2.0 % Final   • Neutrophils, Absolute 12/19/2018 3.08  2.00 - 8.60 10*3/mm3 Final   • Lymphocytes, Absolute 12/19/2018 2.18  0.60 - 4.20 10*3/mm3 Final   • Monocytes, Absolute 12/19/2018 0.45  0.00 - 0.90 10*3/mm3 Final   • Eosinophils, Absolute 12/19/2018 0.20  0.00 - 0.70 10*3/mm3 Final   • Basophils, Absolute 12/19/2018 0.01  0.00 - 0.20 10*3/mm3 Final   • Glucose 12/19/2018 103* 74 - 99 mg/dL Final   • BUN 12/19/2018 18  9 - 20 mg/dL Final   • Creatinine 12/19/2018 1.13  0.66 - 1.25 mg/dL Final   • Sodium 12/19/2018 142  137 - 145 mmol/L Final   • Potassium 12/19/2018 4.2  3.4 - 5.0 mmol/L Final   • Chloride 12/19/2018 106  98 - 107 mmol/L Final   • CO2 12/19/2018 27.0  22.0 - 30.0 mmol/L Final   • Calcium 12/19/2018 10.0  8.4 - 10.2 mg/dL Final   • Total Protein 12/19/2018 8.2  6.3 - 8.2 g/dL Final   • Albumin 12/19/2018 5.00  3.50 - 5.00 g/dL Final   • ALT (SGPT) 12/19/2018 18  <=50 U/L Final    • AST (SGOT) 12/19/2018 31  17 - 59 U/L Final   • Alkaline Phosphatase 12/19/2018 102  38 - 126 U/L Final   • Total Bilirubin 12/19/2018 1.0  0.2 - 1.3 mg/dL Final   • eGFR Non African Amer 12/19/2018 63  42 - 98 mL/min/1.73 Final   • Globulin 12/19/2018 3.2  2.3 - 3.5 gm/dL Final   • A/G Ratio 12/19/2018 1.6  1.1 - 1.8 g/dL Final   • BUN/Creatinine Ratio 12/19/2018 15.9  7.0 - 25.0 Final   • Anion Gap 12/19/2018 9.0  5.0 - 15.0 mmol/L Final   • Total Cholesterol 12/19/2018 199  150 - 200 mg/dL Final   • Triglycerides 12/19/2018 73  <=150 mg/dL Final   • HDL Cholesterol 12/19/2018 54  40 - 59 mg/dL Final   • LDL Cholesterol  12/19/2018 130* <=100 mg/dL Final   • VLDL Cholesterol 12/19/2018 14.6  mg/dL Final   • LDL/HDL Ratio 12/19/2018 2.41  0.00 - 3.55 Final   ]

## 2018-12-20 ENCOUNTER — LAB (OUTPATIENT)
Dept: LAB | Facility: OTHER | Age: 78
End: 2018-12-20

## 2018-12-20 DIAGNOSIS — E78.2 MIXED HYPERLIPIDEMIA: Chronic | ICD-10-CM

## 2018-12-20 DIAGNOSIS — Z12.5 SPECIAL SCREENING FOR MALIGNANT NEOPLASM OF PROSTATE: ICD-10-CM

## 2018-12-20 LAB — PSA SERPL-MCNC: 0.38 NG/ML (ref 0–4)

## 2018-12-20 PROCEDURE — 36415 COLL VENOUS BLD VENIPUNCTURE: CPT | Performed by: INTERNAL MEDICINE

## 2018-12-20 PROCEDURE — G0103 PSA SCREENING: HCPCS | Performed by: INTERNAL MEDICINE

## 2019-12-26 ENCOUNTER — LAB (OUTPATIENT)
Dept: LAB | Facility: OTHER | Age: 79
End: 2019-12-26

## 2019-12-26 DIAGNOSIS — I10 ESSENTIAL HYPERTENSION: Primary | ICD-10-CM

## 2019-12-26 DIAGNOSIS — I10 ESSENTIAL HYPERTENSION: ICD-10-CM

## 2019-12-26 DIAGNOSIS — E55.9 VITAMIN D DEFICIENCY: ICD-10-CM

## 2019-12-26 DIAGNOSIS — Z12.5 SCREENING FOR MALIGNANT NEOPLASM OF PROSTATE: Chronic | ICD-10-CM

## 2019-12-26 DIAGNOSIS — M81.0 AGE-RELATED OSTEOPOROSIS WITHOUT CURRENT PATHOLOGICAL FRACTURE: Primary | ICD-10-CM

## 2019-12-26 DIAGNOSIS — R73.01 IFG (IMPAIRED FASTING GLUCOSE): ICD-10-CM

## 2019-12-26 DIAGNOSIS — E78.2 MIXED HYPERLIPIDEMIA: ICD-10-CM

## 2019-12-26 DIAGNOSIS — D69.6 DECREASED PLATELET COUNT (HCC): ICD-10-CM

## 2019-12-26 LAB
ALBUMIN SERPL-MCNC: 4.6 G/DL (ref 3.5–5)
ALBUMIN/GLOB SERPL: 1.3 G/DL (ref 1.1–1.8)
ALP SERPL-CCNC: 97 U/L (ref 38–126)
ALT SERPL W P-5'-P-CCNC: 18 U/L
ANION GAP SERPL CALCULATED.3IONS-SCNC: 7 MMOL/L (ref 5–15)
AST SERPL-CCNC: 30 U/L (ref 17–59)
BASOPHILS # BLD AUTO: 0.02 10*3/MM3 (ref 0–0.2)
BASOPHILS NFR BLD AUTO: 0.3 % (ref 0–1.5)
BILIRUB SERPL-MCNC: 0.8 MG/DL (ref 0.2–1.3)
BUN BLD-MCNC: 20 MG/DL (ref 7–23)
BUN/CREAT SERPL: 21.5 (ref 7–25)
CALCIUM SPEC-SCNC: 9.9 MG/DL (ref 8.4–10.2)
CHLORIDE SERPL-SCNC: 105 MMOL/L (ref 101–112)
CHOLEST SERPL-MCNC: 191 MG/DL (ref 150–200)
CO2 SERPL-SCNC: 29 MMOL/L (ref 22–30)
CREAT BLD-MCNC: 0.93 MG/DL (ref 0.7–1.3)
DEPRECATED RDW RBC AUTO: 44.7 FL (ref 37–54)
EOSINOPHIL # BLD AUTO: 0.18 10*3/MM3 (ref 0–0.4)
EOSINOPHIL NFR BLD AUTO: 2.7 % (ref 0.3–6.2)
ERYTHROCYTE [DISTWIDTH] IN BLOOD BY AUTOMATED COUNT: 13.4 % (ref 12.3–15.4)
GFR SERPL CREATININE-BSD FRML MDRD: 78 ML/MIN/1.73 (ref 42–98)
GLOBULIN UR ELPH-MCNC: 3.5 GM/DL (ref 2.3–3.5)
GLUCOSE BLD-MCNC: 91 MG/DL (ref 70–99)
HCT VFR BLD AUTO: 44.1 % (ref 37.5–51)
HDLC SERPL-MCNC: 48 MG/DL (ref 40–59)
HGB BLD-MCNC: 14.5 G/DL (ref 13–17.7)
LDLC SERPL CALC-MCNC: 121 MG/DL
LDLC/HDLC SERPL: 2.53 {RATIO} (ref 0–3.55)
LYMPHOCYTES # BLD AUTO: 1.92 10*3/MM3 (ref 0.7–3.1)
LYMPHOCYTES NFR BLD AUTO: 28.8 % (ref 19.6–45.3)
MCH RBC QN AUTO: 30.6 PG (ref 26.6–33)
MCHC RBC AUTO-ENTMCNC: 32.9 G/DL (ref 31.5–35.7)
MCV RBC AUTO: 93 FL (ref 79–97)
MONOCYTES # BLD AUTO: 0.44 10*3/MM3 (ref 0.1–0.9)
MONOCYTES NFR BLD AUTO: 6.6 % (ref 5–12)
NEUTROPHILS # BLD AUTO: 4.11 10*3/MM3 (ref 1.7–7)
NEUTROPHILS NFR BLD AUTO: 61.6 % (ref 42.7–76)
PLATELET # BLD AUTO: 163 10*3/MM3 (ref 140–450)
PMV BLD AUTO: 13 FL (ref 6–12)
POTASSIUM BLD-SCNC: 4 MMOL/L (ref 3.4–5)
PROT SERPL-MCNC: 8.1 G/DL (ref 6.3–8.6)
RBC # BLD AUTO: 4.74 10*6/MM3 (ref 4.14–5.8)
SODIUM BLD-SCNC: 141 MMOL/L (ref 137–145)
TRIGL SERPL-MCNC: 109 MG/DL
VLDLC SERPL-MCNC: 21.8 MG/DL
WBC NRBC COR # BLD: 6.67 10*3/MM3 (ref 3.4–10.8)

## 2019-12-26 PROCEDURE — 83036 HEMOGLOBIN GLYCOSYLATED A1C: CPT | Performed by: INTERNAL MEDICINE

## 2019-12-26 PROCEDURE — 80053 COMPREHEN METABOLIC PANEL: CPT | Performed by: INTERNAL MEDICINE

## 2019-12-26 PROCEDURE — 85025 COMPLETE CBC W/AUTO DIFF WBC: CPT | Performed by: INTERNAL MEDICINE

## 2019-12-26 PROCEDURE — 80061 LIPID PANEL: CPT | Performed by: INTERNAL MEDICINE

## 2019-12-26 PROCEDURE — 82306 VITAMIN D 25 HYDROXY: CPT | Performed by: INTERNAL MEDICINE

## 2019-12-26 PROCEDURE — 84443 ASSAY THYROID STIM HORMONE: CPT | Performed by: INTERNAL MEDICINE

## 2019-12-26 PROCEDURE — 36415 COLL VENOUS BLD VENIPUNCTURE: CPT | Performed by: INTERNAL MEDICINE

## 2019-12-26 PROCEDURE — G0103 PSA SCREENING: HCPCS | Performed by: INTERNAL MEDICINE

## 2019-12-27 LAB
25(OH)D3 SERPL-MCNC: 65.5 NG/ML (ref 30–100)
HBA1C MFR BLD: 5.5 % (ref 4.8–5.6)
PSA SERPL-MCNC: 0.29 NG/ML (ref 0–4)
TSH SERPL DL<=0.05 MIU/L-ACNC: 2.2 UIU/ML (ref 0.27–4.2)

## 2019-12-30 ENCOUNTER — OFFICE VISIT (OUTPATIENT)
Dept: FAMILY MEDICINE CLINIC | Facility: CLINIC | Age: 79
End: 2019-12-30

## 2019-12-30 VITALS
HEART RATE: 52 BPM | DIASTOLIC BLOOD PRESSURE: 84 MMHG | TEMPERATURE: 96.7 F | HEIGHT: 74 IN | SYSTOLIC BLOOD PRESSURE: 130 MMHG | WEIGHT: 181.8 LBS | BODY MASS INDEX: 23.33 KG/M2

## 2019-12-30 DIAGNOSIS — Z12.5 SCREENING FOR MALIGNANT NEOPLASM OF PROSTATE: Chronic | ICD-10-CM

## 2019-12-30 DIAGNOSIS — E78.2 MIXED HYPERLIPIDEMIA: Chronic | ICD-10-CM

## 2019-12-30 DIAGNOSIS — I10 ESSENTIAL HYPERTENSION: Chronic | ICD-10-CM

## 2019-12-30 DIAGNOSIS — M81.0 AGE-RELATED OSTEOPOROSIS WITHOUT CURRENT PATHOLOGICAL FRACTURE: Chronic | ICD-10-CM

## 2019-12-30 DIAGNOSIS — K21.9 GASTROESOPHAGEAL REFLUX DISEASE WITHOUT ESOPHAGITIS: Chronic | ICD-10-CM

## 2019-12-30 DIAGNOSIS — R73.01 IFG (IMPAIRED FASTING GLUCOSE): Chronic | ICD-10-CM

## 2019-12-30 DIAGNOSIS — Z00.00 MEDICARE ANNUAL WELLNESS VISIT, INITIAL: Primary | ICD-10-CM

## 2019-12-30 PROCEDURE — G0439 PPPS, SUBSEQ VISIT: HCPCS | Performed by: INTERNAL MEDICINE

## 2019-12-30 PROCEDURE — 99214 OFFICE O/P EST MOD 30 MIN: CPT | Performed by: INTERNAL MEDICINE

## 2019-12-30 NOTE — PROGRESS NOTES
Subjective        History of Present Illness     Corey Maki is a 79 y.o. male who presents for annual follow up on GERD, hyperlipidemia, and osteoporosis among other issues.  He resides in [place] most of the year. His wife had a bad biking accident while exercising four years ago, which has left her disabled.  She also has a history of breast cancer, which has metastasided to the lungs.  He reports lumbar compression fracture after trying to lift his wife, although, he states this has almost completely resolved.     He has a DEXA scheduled for tomorrow.  DEXA 12/2017 revealed increased density of 8% in lumbar spine from prior DEXA 07/2015 with persistent osteoporosis with a decrease in density of the femoral neck from prior study.  He reports new problem of noncompliance with the weekly Fosamax.  Avoiding dairy products has helped managed GERD.  He reports getting dietary calcium.  He drinks a lot of almond milk, but is not sure if the almond milk contains calcium.  Vitamin D and calcium at goal.      He reports new problem with nasal congestion, for which he reports good results when he adds Flonase nasal spray.   Dr. Miles has addressed this in the past.       He reports a fall down some steps a few months ago, but denies injury.      He had colonoscopy 2007.  Patient declines future colonoscopies due to age.  He will be 80 in the spring.  He declines rectal exam today.    Patient continues to decline influenza and pneumonia vaccines.  He will let us know if he changes his mind.      Weight is up 1 pound in the past year.  He continues walking 4 miles at least four days weekly for exercise.  Blood pressure at goal.       The patient's relevant past medical, surgical, and social history was reviewed in Epic.   Lab results are reviewed with the patient today.  CBC and CMP unremarkable.  Total cholesterol 191. HDL 48.  . Triglycerides 109.  LDL/HDL ratio 2.53.  PSA normal limits.       Review of Systems  "  Constitutional: Negative for chills, fatigue and fever.   HENT: Negative for congestion, ear pain, postnasal drip, sinus pressure and sore throat.    Respiratory: Negative for cough, shortness of breath and wheezing.    Cardiovascular: Negative for chest pain, palpitations and leg swelling.   Gastrointestinal: Negative for abdominal pain, blood in stool, constipation, diarrhea, nausea and vomiting.   Endocrine: Negative for cold intolerance, heat intolerance, polydipsia and polyuria.   Genitourinary: Negative for dysuria, frequency, hematuria and urgency.   Skin: Negative for rash.   Neurological: Negative for syncope and weakness.        Objective      Visit Vitals  /84   Pulse 52   Temp 96.7 °F (35.9 °C) (Oral)   Ht 188 cm (74\")   Wt 82.5 kg (181 lb 12.8 oz)   BMI 23.34 kg/m²       Physical Exam   Constitutional: He is oriented to person, place, and time. He appears well-developed and well-nourished. No distress.   HENT:   Head: Normocephalic and atraumatic.   Nose: Right sinus exhibits no maxillary sinus tenderness and no frontal sinus tenderness. Left sinus exhibits no maxillary sinus tenderness and no frontal sinus tenderness.   Mouth/Throat: Uvula is midline, oropharynx is clear and moist and mucous membranes are normal. No oral lesions. No tonsillar exudate.   Eyes: Pupils are equal, round, and reactive to light. Conjunctivae and EOM are normal.   Neck: Trachea normal. Neck supple. No JVD present. Carotid bruit is not present. No tracheal deviation present. No thyroid mass and no thyromegaly present.   Cardiovascular: Normal rate, regular rhythm, normal heart sounds and intact distal pulses.  No extrasystoles are present. PMI is not displaced.   No murmur heard.  Pulmonary/Chest: Effort normal and breath sounds normal. No accessory muscle usage. No respiratory distress. He has no decreased breath sounds. He has no wheezes. He has no rhonchi. He has no rales.   Abdominal: Soft. Bowel sounds are normal. " He exhibits no distension. There is no hepatosplenomegaly. There is no tenderness.     Vascular Status -  His right foot exhibits normal foot vasculature  and no edema. His left foot exhibits normal foot vasculature  and no edema.  Lymphadenopathy:     He has no cervical adenopathy.   Neurological: He is alert and oriented to person, place, and time. No cranial nerve deficit. Coordination normal.   Skin: Skin is warm, dry and intact. No rash noted. No cyanosis. Nails show no clubbing.   Psychiatric: He has a normal mood and affect. His speech is normal and behavior is normal. Judgment and thought content normal.   Vitals reviewed.      Future Appointments   Date Time Provider Department Center   12/31/2019  1:00 PM [facility] DEXA 1 [facility] [place]   1/4/2021  8:00 AM Keegan Sainz MD [facility] None       Assessment/Plan      Continue the Norvas.    Keep scheduled appointment for repeat DEXA tomorrow. The noncompliance with Fosamax is a new problem. Recommended compliance with the weekly Fosamax.   Add one Caltrate or Citracal daily if he finds there is little/no calcium in the almond milk.  If he adds the calcium supplement, he can decrease the vitamin D to 5 days weekly.      Continue to address the lipids with diet and walking for exercise.    The sinus congestion and postnasal drip is a new problem, which flares frequently.  I offered to treat, but he will use OTC meds as needed, as recommended by Dr. Miles.     He declines pneumonia and influenza vaccines as well as future colonoscopies.       Return in one year for annual exam with fasting labs one week prior.      Scribed for Dr. Sainz by Lorraine Patel [facility].     Diagnoses and all orders for this visit:    Medicare annual wellness visit, initial    Essential hypertension  -     CBC Auto Differential; Future  -     Comprehensive Metabolic Panel; Future    Mixed hyperlipidemia  -     Lipid Panel; Future  -     TSH; Future    IFG (impaired fasting  glucose)  -     Hemoglobin A1c; Future    Gastroesophageal reflux disease without esophagitis    Age-related osteoporosis without current pathological fracture  -     Vitamin D 25 Hydroxy; Future    Screening for malignant neoplasm of prostate  -     PSA Screen; Future        Lab on 12/26/2019   Component Date Value Ref Range Status   • PSA 12/26/2019 0.293  0.000 - 4.000 ng/mL Final   • 25 Hydroxy, Vitamin D 12/26/2019 65.5  30.0 - 100.0 ng/ml Final   • Total Cholesterol 12/26/2019 191  150 - 200 mg/dL Final   • Triglycerides 12/26/2019 109  <=150 mg/dL Final   • HDL Cholesterol 12/26/2019 48  40 - 59 mg/dL Final   • LDL Cholesterol  12/26/2019 121* <=100 mg/dL Final   • VLDL Cholesterol 12/26/2019 21.8  mg/dL Final   • LDL/HDL Ratio 12/26/2019 2.53  0.00 - 3.55 Final   • Hemoglobin A1C 12/26/2019 5.50  4.80 - 5.60 % Final   • Glucose 12/26/2019 91  70 - 99 mg/dL Final   • BUN 12/26/2019 20  7 - 23 mg/dL Final   • Creatinine 12/26/2019 0.93  0.70 - 1.30 mg/dL Final   • Sodium 12/26/2019 141  137 - 145 mmol/L Final   • Potassium 12/26/2019 4.0  3.4 - 5.0 mmol/L Final   • Chloride 12/26/2019 105  101 - 112 mmol/L Final   • CO2 12/26/2019 29.0  22.0 - 30.0 mmol/L Final   • Calcium 12/26/2019 9.9  8.4 - 10.2 mg/dL Final   • Total Protein 12/26/2019 8.1  6.3 - 8.6 g/dL Final   • Albumin 12/26/2019 4.60  3.50 - 5.00 g/dL Final   • ALT (SGPT) 12/26/2019 18  <=50 U/L Final   • AST (SGOT) 12/26/2019 30  17 - 59 U/L Final   • Alkaline Phosphatase 12/26/2019 97  38 - 126 U/L Final   • Total Bilirubin 12/26/2019 0.8  0.2 - 1.3 mg/dL Final   • eGFR Non African Amer 12/26/2019 78  42 - 98 mL/min/1.73 Final   • Globulin 12/26/2019 3.5  2.3 - 3.5 gm/dL Final   • A/G Ratio 12/26/2019 1.3  1.1 - 1.8 g/dL Final   • BUN/Creatinine Ratio 12/26/2019 21.5  7.0 - 25.0 Final   • Anion Gap 12/26/2019 7.0  5.0 - 15.0 mmol/L Final   • TSH 12/26/2019 2.200  0.270 - 4.200 uIU/mL Final   • WBC 12/26/2019 6.67  3.40 - 10.80 10*3/mm3 Final   • RBC  12/26/2019 4.74  4.14 - 5.80 10*6/mm3 Final   • Hemoglobin 12/26/2019 14.5  13.0 - 17.7 g/dL Final   • Hematocrit 12/26/2019 44.1  37.5 - 51.0 % Final   • MCV 12/26/2019 93.0  79.0 - 97.0 fL Final   • MCH 12/26/2019 30.6  26.6 - 33.0 pg Final   • MCHC 12/26/2019 32.9  31.5 - 35.7 g/dL Final   • RDW 12/26/2019 13.4  12.3 - 15.4 % Final   • RDW-SD 12/26/2019 44.7  37.0 - 54.0 fl Final   • MPV 12/26/2019 13.0* 6.0 - 12.0 fL Final   • Platelets 12/26/2019 163  140 - 450 10*3/mm3 Final   • Neutrophil % 12/26/2019 61.6  42.7 - 76.0 % Final   • Lymphocyte % 12/26/2019 28.8  19.6 - 45.3 % Final   • Monocyte % 12/26/2019 6.6  5.0 - 12.0 % Final   • Eosinophil % 12/26/2019 2.7  0.3 - 6.2 % Final   • Basophil % 12/26/2019 0.3  0.0 - 1.5 % Final   • Neutrophils, Absolute 12/26/2019 4.11  1.70 - 7.00 10*3/mm3 Final   • Lymphocytes, Absolute 12/26/2019 1.92  0.70 - 3.10 10*3/mm3 Final   • Monocytes, Absolute 12/26/2019 0.44  0.10 - 0.90 10*3/mm3 Final   • Eosinophils, Absolute 12/26/2019 0.18  0.00 - 0.40 10*3/mm3 Final   • Basophils, Absolute 12/26/2019 0.02  0.00 - 0.20 10*3/mm3 Final   ]

## 2019-12-30 NOTE — PATIENT INSTRUCTIONS
Medicare Wellness  Personal Prevention Plan of Service     Date of Office Visit:  2019  Encounter Provider:  Keegan Sainz MD  Place of Service:  [facility]  Patient Name: Corey Maki  :  1940    As part of the Medicare Wellness portion of your visit today, we are providing you with this personalized preventive plan of services (PPPS). This plan is based upon recommendations of the [place] Preventive Services Task Force (USPSTF) and the Advisory Committee on Immunization Practices (ACIP).    This lists the preventive care services that should be considered, and provides dates of when you are due. Items listed as completed are up-to-date and do not require any further intervention.    Health Maintenance   Topic Date Due   • TDAP/TD VACCINES (1 - Tdap) 1951   • ZOSTER VACCINE (1 of 2) 1990   • Pneumococcal Vaccine Once at 65 Years Old  2005   • MEDICARE ANNUAL WELLNESS  12/15/2016   • INFLUENZA VACCINE  2019   • DXA SCAN  2019   • LIPID PANEL  2020       No orders of the defined types were placed in this encounter.      Return in about 1 year (around 2020) for Next scheduled follow up.

## 2019-12-30 NOTE — PROGRESS NOTES
The ABCs of the Annual Wellness Visit  Initial Medicare Wellness Visit    Chief Complaint   Patient presents with   • Annual Exam   • Medicare Wellness-Initial Visit       Subjective   History of Present Illness:  Corey Maki is a 79 y.o. male who presents for an Initial Medicare Wellness Visit.    HEALTH RISK ASSESSMENT    Recent Hospitalizations:  No hospitalization(s) within the last year.    Current Medical Providers:  Patient Care Team:  Keegan Sainz MD as PCP - General (Internal Medicine)    Smoking Status:  Social History     Tobacco Use   Smoking Status Former Smoker   Smokeless Tobacco Never Used       Alcohol Consumption:  Social History     Substance and Sexual Activity   Alcohol Use No       Depression Screen:   PHQ-2/PHQ-9 Depression Screening 12/30/2019   Little interest or pleasure in doing things 0   Feeling down, depressed, or hopeless 0   Trouble falling or staying asleep, or sleeping too much -   Feeling tired or having little energy -   Poor appetite or overeating -   Feeling bad about yourself - or that you are a failure or have let yourself or your family down -   Trouble concentrating on things, such as reading the newspaper or watching television -   Moving or speaking so slowly that other people could have noticed. Or the opposite - being so fidgety or restless that you have been moving around a lot more than usual -   Thoughts that you would be better off dead, or of hurting yourself in some way -   Total Score 0   If you checked off any problems, how difficult have these problems made it for you to do your work, take care of things at home, or get along with other people? -       Fall Risk Screen:  STEADI Fall Risk Assessment was completed, and patient is at MODERATE risk for falls. Assessment completed on:12/30/2019    Health Habits and Functional and Cognitive Screening:  Functional & Cognitive Status 12/30/2019   Do you have difficulty preparing food and eating? No   Do you have  difficulty bathing yourself, getting dressed or grooming yourself? No   Do you have difficulty using the toilet? No   Do you have difficulty moving around from place to place? No   Do you have trouble with steps or getting out of a bed or a chair? No   Current Diet Limited Junk Food   Dental Exam Up to date   Eye Exam Not up to date   Exercise (times per week) 4 times per week   Current Exercise Activities Include Walking   Do you need help using the phone?  No   Are you deaf or do you have serious difficulty hearing?  No   Do you need help with transportation? No   Do you need help shopping? No   Do you need help preparing meals?  No   Do you need help with housework?  No   Do you need help with laundry? No   Do you need help taking your medications? No   Do you need help managing money? No   Do you ever drive or ride in a car without wearing a seat belt? No   Have you felt unusual stress, anger or loneliness in the last month? No   Who do you live with? Spouse   If you need help, do you have trouble finding someone available to you? No   Have you been bothered in the last four weeks by sexual problems? No   Do you have difficulty concentrating, remembering or making decisions? No         Does the patient have evidence of cognitive impairment? No    Asprin use counseling:Does not need ASA (and currently is not on it)    Age-appropriate Screening Schedule:  Refer to the list below for future screening recommendations based on patient's age, sex and/or medical conditions. Orders for these recommended tests are listed in the plan section. The patient has been provided with a written plan.    Health Maintenance   Topic Date Due   • TDAP/TD VACCINES (1 - Tdap) 04/22/1951   • ZOSTER VACCINE (1 of 2) 04/22/1990   • DXA SCAN  12/20/2019   • LIPID PANEL  12/26/2020   • INFLUENZA VACCINE  Addressed          The following portions of the patient's history were reviewed and updated as appropriate:   He  has a past medical  history of Allergic rhinitis, Decreased platelet count (CMS/HCC), Diverticular disease of colon, GERD (gastroesophageal reflux disease), History of fracture of vertebral column, Hypercholesterolemia, Hyperlipidemia, Osteoporosis, Resting tremor (12/27/2017), and Screening for malignant neoplasm of prostate.  He does not have any pertinent problems on file.  He  has a past surgical history that includes Colonoscopy (2007) and Esophageal dilation.  His family history includes Colon polyps in an other family member; Peripheral vascular disease in an other family member.  He  reports that he has quit smoking. He has never used smokeless tobacco. He reports that he does not drink alcohol or use drugs.  Current Outpatient Medications   Medication Sig Dispense Refill   • amLODIPine (NORVASC) 5 MG tablet Take 1 tablet by mouth Daily.  1   • Cholecalciferol (VITAMIN D3) 5000 UNITS capsule capsule Take 5,000 Units by mouth Daily.     • alendronate (FOSAMAX) 70 MG tablet Take 1 tablet by mouth Every 7 (Seven) Days. 4 tablet 12     No current facility-administered medications for this visit.      Current Outpatient Medications on File Prior to Visit   Medication Sig   • amLODIPine (NORVASC) 5 MG tablet Take 1 tablet by mouth Daily.   • Cholecalciferol (VITAMIN D3) 5000 UNITS capsule capsule Take 5,000 Units by mouth Daily.   • alendronate (FOSAMAX) 70 MG tablet Take 1 tablet by mouth Every 7 (Seven) Days.     No current facility-administered medications on file prior to visit.      He is allergic to lisinopril..    Outpatient Medications Prior to Visit   Medication Sig Dispense Refill   • amLODIPine (NORVASC) 5 MG tablet Take 1 tablet by mouth Daily.  1   • Cholecalciferol (VITAMIN D3) 5000 UNITS capsule capsule Take 5,000 Units by mouth Daily.     • alendronate (FOSAMAX) 70 MG tablet Take 1 tablet by mouth Every 7 (Seven) Days. 4 tablet 12     No facility-administered medications prior to visit.        Patient Active Problem  "List   Diagnosis   • Screening for malignant neoplasm of prostate   • Osteoporosis - started Fosamax 12/2017   • Hyperlipidemia   • History of fracture of vertebral column   • GERD (gastroesophageal reflux disease)   • Diverticular disease of colon   • Allergic rhinitis   • Sinus bradycardia   • Resting tremor   • Essential hypertension   • IFG (impaired fasting glucose)       Advanced Care Planning:  Patient has an advance directive - a copy has not been provided. Have asked the patient to send this to us to add to record    Review of Systems    Compared to one year ago, the patient feels his physical health is the same.  Compared to one year ago, the patient feels his mental health is the same.    Reviewed chart for potential of high risk medication in the elderly: yes  Reviewed chart for potential of harmful drug interactions in the elderly:yes    Objective         Vitals:    12/30/19 0914   BP: 130/84   Pulse: 52   Temp: 96.7 °F (35.9 °C)   TempSrc: Oral   Weight: 82.5 kg (181 lb 12.8 oz)   Height: 188 cm (74\")   PainSc: 0-No pain       Body mass index is 23.34 kg/m².  Discussed the patient's BMI with him. The BMI is in the acceptable range.    Physical Exam    Lab Results   Component Value Date    TRIG 109 12/26/2019    HDL 48 12/26/2019     (H) 12/26/2019    VLDL 21.8 12/26/2019    HGBA1C 5.50 12/26/2019        Assessment/Plan   Medicare Risks and Personalized Health Plan  CMS Preventative Services Quick Reference  Advance Directive Discussion  Diabetic Lab Screening     The above risks/problems have been discussed with the patient.  Pertinent information has been shared with the patient in the After Visit Summary.  Follow up plans and orders are seen below in the Assessment/Plan Section.    Diagnoses and all orders for this visit:    1. Medicare annual wellness visit, initial (Primary)    2. Essential hypertension  -     CBC Auto Differential; Future  -     Comprehensive Metabolic Panel; Future    3. " Mixed hyperlipidemia  -     Lipid Panel; Future  -     TSH; Future    4. IFG (impaired fasting glucose)  -     Hemoglobin A1c; Future    5. Gastroesophageal reflux disease without esophagitis    6. Age-related osteoporosis without current pathological fracture  -     Vitamin D 25 Hydroxy; Future    7. Screening for malignant neoplasm of prostate  -     PSA Screen; Future      Follow Up:  Return in about 1 year (around 12/30/2020) for Next scheduled follow up.     An After Visit Summary and PPPS were given to the patient.

## 2020-12-30 ENCOUNTER — LAB (OUTPATIENT)
Dept: LAB | Facility: OTHER | Age: 80
End: 2020-12-30

## 2020-12-30 DIAGNOSIS — E78.2 MIXED HYPERLIPIDEMIA: Chronic | ICD-10-CM

## 2020-12-30 DIAGNOSIS — R73.01 IFG (IMPAIRED FASTING GLUCOSE): Chronic | ICD-10-CM

## 2020-12-30 DIAGNOSIS — I10 ESSENTIAL HYPERTENSION: Chronic | ICD-10-CM

## 2020-12-30 DIAGNOSIS — Z12.5 SCREENING FOR MALIGNANT NEOPLASM OF PROSTATE: Chronic | ICD-10-CM

## 2020-12-30 DIAGNOSIS — M81.0 AGE-RELATED OSTEOPOROSIS WITHOUT CURRENT PATHOLOGICAL FRACTURE: Chronic | ICD-10-CM

## 2020-12-30 LAB
25(OH)D3 SERPL-MCNC: 68.8 NG/ML (ref 30–100)
ALBUMIN SERPL-MCNC: 4.2 G/DL (ref 3.5–5)
ALBUMIN/GLOB SERPL: 1.3 G/DL (ref 1.1–1.8)
ALP SERPL-CCNC: 98 U/L (ref 38–126)
ALT SERPL W P-5'-P-CCNC: 12 U/L
ANION GAP SERPL CALCULATED.3IONS-SCNC: 5 MMOL/L (ref 5–15)
AST SERPL-CCNC: 24 U/L (ref 17–59)
BASOPHILS # BLD AUTO: 0.01 10*3/MM3 (ref 0–0.2)
BASOPHILS NFR BLD AUTO: 0.2 % (ref 0–1.5)
BILIRUB SERPL-MCNC: 0.9 MG/DL (ref 0.2–1.3)
BUN SERPL-MCNC: 20 MG/DL (ref 7–23)
BUN/CREAT SERPL: 20.6 (ref 7–25)
CALCIUM SPEC-SCNC: 10 MG/DL (ref 8.4–10.2)
CHLORIDE SERPL-SCNC: 104 MMOL/L (ref 101–112)
CHOLEST SERPL-MCNC: 204 MG/DL (ref 150–200)
CO2 SERPL-SCNC: 30 MMOL/L (ref 22–30)
CREAT SERPL-MCNC: 0.97 MG/DL (ref 0.7–1.3)
DEPRECATED RDW RBC AUTO: 44.9 FL (ref 37–54)
EOSINOPHIL # BLD AUTO: 0.13 10*3/MM3 (ref 0–0.4)
EOSINOPHIL NFR BLD AUTO: 2.2 % (ref 0.3–6.2)
ERYTHROCYTE [DISTWIDTH] IN BLOOD BY AUTOMATED COUNT: 13.4 % (ref 12.3–15.4)
GFR SERPL CREATININE-BSD FRML MDRD: 74 ML/MIN/1.73 (ref 42–98)
GLOBULIN UR ELPH-MCNC: 3.3 GM/DL (ref 2.3–3.5)
GLUCOSE SERPL-MCNC: 97 MG/DL (ref 70–99)
HBA1C MFR BLD: 5.5 % (ref 4.8–5.6)
HCT VFR BLD AUTO: 42.7 % (ref 37.5–51)
HDLC SERPL-MCNC: 58 MG/DL (ref 40–59)
HGB BLD-MCNC: 14 G/DL (ref 13–17.7)
LDLC SERPL CALC-MCNC: 132 MG/DL
LDLC/HDLC SERPL: 2.25 {RATIO} (ref 0–3.55)
LYMPHOCYTES # BLD AUTO: 1.83 10*3/MM3 (ref 0.7–3.1)
LYMPHOCYTES NFR BLD AUTO: 30.6 % (ref 19.6–45.3)
MCH RBC QN AUTO: 31 PG (ref 26.6–33)
MCHC RBC AUTO-ENTMCNC: 32.8 G/DL (ref 31.5–35.7)
MCV RBC AUTO: 94.7 FL (ref 79–97)
MONOCYTES # BLD AUTO: 0.36 10*3/MM3 (ref 0.1–0.9)
MONOCYTES NFR BLD AUTO: 6 % (ref 5–12)
NEUTROPHILS NFR BLD AUTO: 3.66 10*3/MM3 (ref 1.7–7)
NEUTROPHILS NFR BLD AUTO: 61 % (ref 42.7–76)
PLATELET # BLD AUTO: 143 10*3/MM3 (ref 140–450)
PMV BLD AUTO: 11.8 FL (ref 6–12)
POTASSIUM SERPL-SCNC: 4.3 MMOL/L (ref 3.4–5)
PROT SERPL-MCNC: 7.5 G/DL (ref 6.3–8.6)
PSA SERPL-MCNC: 0.27 NG/ML (ref 0–4)
RBC # BLD AUTO: 4.51 10*6/MM3 (ref 4.14–5.8)
SODIUM SERPL-SCNC: 139 MMOL/L (ref 137–145)
TRIGL SERPL-MCNC: 78 MG/DL
TSH SERPL DL<=0.05 MIU/L-ACNC: 2.09 UIU/ML (ref 0.27–4.2)
VLDLC SERPL-MCNC: 14 MG/DL (ref 5–40)
WBC # BLD AUTO: 5.99 10*3/MM3 (ref 3.4–10.8)

## 2020-12-30 PROCEDURE — 84443 ASSAY THYROID STIM HORMONE: CPT | Performed by: INTERNAL MEDICINE

## 2020-12-30 PROCEDURE — G0103 PSA SCREENING: HCPCS | Performed by: INTERNAL MEDICINE

## 2020-12-30 PROCEDURE — 36415 COLL VENOUS BLD VENIPUNCTURE: CPT | Performed by: INTERNAL MEDICINE

## 2020-12-30 PROCEDURE — 80053 COMPREHEN METABOLIC PANEL: CPT | Performed by: INTERNAL MEDICINE

## 2020-12-30 PROCEDURE — 83036 HEMOGLOBIN GLYCOSYLATED A1C: CPT | Performed by: INTERNAL MEDICINE

## 2020-12-30 PROCEDURE — 80061 LIPID PANEL: CPT | Performed by: INTERNAL MEDICINE

## 2020-12-30 PROCEDURE — 85025 COMPLETE CBC W/AUTO DIFF WBC: CPT | Performed by: INTERNAL MEDICINE

## 2020-12-30 PROCEDURE — 82306 VITAMIN D 25 HYDROXY: CPT | Performed by: INTERNAL MEDICINE

## 2021-01-04 ENCOUNTER — OFFICE VISIT (OUTPATIENT)
Dept: FAMILY MEDICINE CLINIC | Facility: CLINIC | Age: 81
End: 2021-01-04

## 2021-01-04 VITALS
BODY MASS INDEX: 22.8 KG/M2 | HEIGHT: 73 IN | SYSTOLIC BLOOD PRESSURE: 137 MMHG | DIASTOLIC BLOOD PRESSURE: 78 MMHG | WEIGHT: 172 LBS | HEART RATE: 60 BPM

## 2021-01-04 DIAGNOSIS — K21.9 GASTROESOPHAGEAL REFLUX DISEASE WITHOUT ESOPHAGITIS: Chronic | ICD-10-CM

## 2021-01-04 DIAGNOSIS — E78.2 MIXED HYPERLIPIDEMIA: Primary | Chronic | ICD-10-CM

## 2021-01-04 DIAGNOSIS — Z87.81 HISTORY OF FRACTURE OF VERTEBRAL COLUMN: ICD-10-CM

## 2021-01-04 DIAGNOSIS — Z12.5 SCREENING FOR MALIGNANT NEOPLASM OF PROSTATE: ICD-10-CM

## 2021-01-04 DIAGNOSIS — J30.1 SEASONAL ALLERGIC RHINITIS DUE TO POLLEN: Chronic | ICD-10-CM

## 2021-01-04 DIAGNOSIS — M81.0 AGE-RELATED OSTEOPOROSIS WITHOUT CURRENT PATHOLOGICAL FRACTURE: Chronic | ICD-10-CM

## 2021-01-04 DIAGNOSIS — I10 ESSENTIAL HYPERTENSION: Chronic | ICD-10-CM

## 2021-01-04 PROCEDURE — G2025 DIS SITE TELE SVCS RHC/FQHC: HCPCS | Performed by: INTERNAL MEDICINE

## 2021-01-04 RX ORDER — FLUTICASONE PROPIONATE 50 MCG
1 SPRAY, SUSPENSION (ML) NASAL 2 TIMES DAILY PRN
Qty: 1 BOTTLE | Refills: 11 | Status: SHIPPED | OUTPATIENT
Start: 2021-01-04

## 2021-01-04 RX ORDER — FLUTICASONE PROPIONATE 50 MCG
1 SPRAY, SUSPENSION (ML) NASAL 2 TIMES DAILY PRN
COMMUNITY
Start: 2020-10-05 | End: 2021-01-04 | Stop reason: SDUPTHER

## 2021-01-04 RX ORDER — CETIRIZINE HYDROCHLORIDE 10 MG/1
10 TABLET ORAL DAILY
COMMUNITY
End: 2022-07-26

## 2021-01-04 RX ORDER — LOSARTAN POTASSIUM 25 MG/1
1 TABLET ORAL DAILY
COMMUNITY
End: 2021-01-18 | Stop reason: SDUPTHER

## 2021-01-04 NOTE — PROGRESS NOTES
You have chosen to receive care through a telephone visit. Do you consent to use a telephone visit for your medical care today? Yes   Total visit time:  31 minutes.     Chief Complaint  Annual Exam (wife passed away 1 month ago )    Subjective    History of Present Illness        History of Present Illness     Corey receives care today at [facility] [place] via telephone visit for annual follow up of hypertension, GERD, hyperlipidemia, and osteoporosis among other issues.  He is appropriately grieving the loss of his wife, who passed away approximately a month ago, after a long cancer leavitt. He continues to walk for exercise and reports he is sleeping well.      Patient reports new problem of worsening chronic thick postnasal drainage for the past couple of months. He has electric heat and stove.  He takes Zyrtec almost daily and saline nasal spray frequently, but has not used his nasal steroid spray in quite some time.  He has a history of esophageal dilation and didn't know if this was playing a factor, although, he denies GERD symptoms    He has a history of low back pain and lumbar compression fracture after trying to lift his wife, which flared again approximately six months ago.  Pain is worse with weightbearing, but relieved with sitting or lying down.  It does seem to affect his balance at times.  He had an MRI within the past year while in [place].  I have asked him to give us a copy of MRI report.  He occasionally takes Tylenol for the pain.         DEXA 12/2019 reveals normal density of the lumbar spine, but persistent osteoporosis of the hip, for which he takes weekly Fosamax and vitamin D and calcium supplements.     He reports a 9-pound weight loss in the past year.  Blood pressure and heart rate at goal.  His BMI is excellent.    I have reviewed all of the lab results with the patient.  Cholesterol is slightly higher, although, his excellent HDL gives him a favorable  "cholesterol ratio.       He continues to decline all traditional preventative vaccinations, but does state he will consider a COVID vaccine when available.  I encouraged him to also consider pneumonia vaccine.      Objective     Vital Signs:     /78   Pulse 60   Ht 185.4 cm (73\")   Wt 78 kg (172 lb)   BMI 22.69 kg/m²       Physical Exam   Result Review :     CMP    CMP 12/30/20   BUN 20   Creatinine 0.97   eGFR Non African Am 74   Sodium 139   Potassium 4.3   Chloride 104   Calcium 10.0   Albumin 4.20   Total Bilirubin 0.9   Alkaline Phosphatase 98   AST (SGOT) 24   ALT (SGPT) 12           CBC w/diff    CBC w/Diff 12/30/20   WBC 5.99   RBC 4.51   Hemoglobin 14.0   Hematocrit 42.7   MCV 94.7   MCH 31.0   MCHC 32.8   RDW 13.4   Platelets 143   Neutrophil Rel % 61.0   Lymphocyte Rel % 30.6   Monocyte Rel % 6.0   Eosinophil Rel % 2.2   Basophil Rel % 0.2           Lipid Panel    Lipid Panel 12/30/20   Triglycerides 78   HDL Cholesterol 58   VLDL Cholesterol 14   LDL Cholesterol  132 (A)   LDL/HDL Ratio 2.25   (A) Abnormal value            TSH    TSH 12/30/20   TSH 2.090           A1C Last 3 Results    HGBA1C Last 3 Results 12/30/20   Hemoglobin A1C 5.50           Data reviewed: Radiologic studies DEXA          Assessment and Plan    Problem List Items Addressed This Visit     None        He is appropriately grieving the recent loss of his wife.  He will notify us if he feels he is not adequately coping.        For the chronic lumbar pain, I requested he get us a copy of the report of the MRI he had in [place] during the past year.  He can increase the frequency of the Tylenol, up to 2500 mg daily total dose..  With his normal renal function, it would be fine for him to add naproxen sodium 220 mg one tablet b.i.d. with food.  He would like to delay any further intervention or testing until the COVID restictions have eased or he can be vaccinated.      I encouraged patient to reconsider having Pneumovax and to " get COVID vaccine when available.       For the seasonal allergic rhinitis, I recommended he resume the Flonase nasal spray one spray each nostril b.i.d. Prescription sent.  Continue the saline nasal spray. Continue the daily antihistamine.  Recommended he trying using a humidifier, especially during the winter heating months.            Continue weekly Fosamax and calcium and vitamin D supplements.  He will be due repeat DEXA 12/2021.      Cholesterol well controlled with his high HDL.   Continue to address the lipids with diet and walking for exercise.    Continue Norvasc and Cozaar. Blood pressure at goal.  Monitor BP and notify me if not consistently at goal.       Return in one year for annual exam with fasting labs one week prior.       Scribed for Dr. Sainz by Lorraine Patel [facility].       Follow Up   Return in about 1 year (around 1/4/2022) for Next scheduled follow up.  Patient was given instructions and counseling regarding his condition or for health maintenance advice. Please see specific information pulled into the AVS if appropriate.

## 2021-01-18 ENCOUNTER — OFFICE VISIT (OUTPATIENT)
Dept: FAMILY MEDICINE CLINIC | Facility: CLINIC | Age: 81
End: 2021-01-18

## 2021-01-18 ENCOUNTER — TELEPHONE (OUTPATIENT)
Dept: FAMILY MEDICINE CLINIC | Facility: CLINIC | Age: 81
End: 2021-01-18

## 2021-01-18 VITALS — HEIGHT: 73 IN | WEIGHT: 172 LBS | BODY MASS INDEX: 22.8 KG/M2

## 2021-01-18 DIAGNOSIS — M81.0 AGE-RELATED OSTEOPOROSIS WITHOUT CURRENT PATHOLOGICAL FRACTURE: Chronic | ICD-10-CM

## 2021-01-18 DIAGNOSIS — I10 ESSENTIAL HYPERTENSION: Chronic | ICD-10-CM

## 2021-01-18 DIAGNOSIS — Z00.00 MEDICARE ANNUAL WELLNESS VISIT, SUBSEQUENT: Primary | ICD-10-CM

## 2021-01-18 DIAGNOSIS — Z28.21 INFLUENZA VACCINATION DECLINED BY PATIENT: ICD-10-CM

## 2021-01-18 DIAGNOSIS — Z28.21 PNEUMOCOCCAL VACCINATION DECLINED BY PATIENT: ICD-10-CM

## 2021-01-18 PROCEDURE — G0439 PPPS, SUBSEQ VISIT: HCPCS | Performed by: INTERNAL MEDICINE

## 2021-01-18 RX ORDER — AMLODIPINE BESYLATE 5 MG/1
5 TABLET ORAL DAILY
Qty: 90 TABLET | Refills: 1 | Status: SHIPPED | OUTPATIENT
Start: 2021-01-18 | End: 2021-10-11 | Stop reason: SDUPTHER

## 2021-01-18 RX ORDER — LOSARTAN POTASSIUM 100 MG/1
100 TABLET ORAL DAILY
Qty: 90 TABLET | Refills: 3 | Status: SHIPPED | OUTPATIENT
Start: 2021-01-18 | End: 2021-01-29 | Stop reason: ALTCHOICE

## 2021-01-18 NOTE — PROGRESS NOTES
You have chosen to receive care through a telephone visit. Do you consent to use a telephone visit for your medical care today? Yes    The ABCs of the Annual Wellness Visit  Subsequent Medicare Wellness Visit    Chief Complaint   Patient presents with   • Medicare Wellness-subsequent       Subjective   History of Present Illness:  Corey Maki is a 80 y.o. male who presents for a Subsequent Medicare Wellness Visit.    HEALTH RISK ASSESSMENT    Recent Hospitalizations:  No hospitalization(s) within the last year.    Current Medical Providers:  Patient Care Team:  Keegan Sainz MD as PCP - General (Internal Medicine)    Smoking Status:  Social History     Tobacco Use   Smoking Status Former Smoker   • Packs/day: 0.50   • Years: 10.00   • Pack years: 5.00   • Quit date:    • Years since quittin.0   Smokeless Tobacco Never Used       Alcohol Consumption:  Social History     Substance and Sexual Activity   Alcohol Use No       Depression Screen:   PHQ-2/PHQ-9 Depression Screening 2021   Little interest or pleasure in doing things 0   Feeling down, depressed, or hopeless 1   Trouble falling or staying asleep, or sleeping too much -   Feeling tired or having little energy -   Poor appetite or overeating -   Feeling bad about yourself - or that you are a failure or have let yourself or your family down -   Trouble concentrating on things, such as reading the newspaper or watching television -   Moving or speaking so slowly that other people could have noticed. Or the opposite - being so fidgety or restless that you have been moving around a lot more than usual -   Thoughts that you would be better off dead, or of hurting yourself in some way -   Total Score 1   If you checked off any problems, how difficult have these problems made it for you to do your work, take care of things at home, or get along with other people? -       Fall Risk Screen:  STEADI Fall Risk Assessment was completed, and patient is at  LOW risk for falls.Assessment completed on:1/18/2021    Health Habits and Functional and Cognitive Screening:  Functional & Cognitive Status 1/18/2021   Do you have difficulty preparing food and eating? No   Do you have difficulty bathing yourself, getting dressed or grooming yourself? No   Do you have difficulty using the toilet? No   Do you have difficulty moving around from place to place? No   Do you have trouble with steps or getting out of a bed or a chair? No   Current Diet Limited Junk Food   Dental Exam Up to date   Eye Exam Up to date   Exercise (times per week) 7 times per week   Current Exercise Activities Include Walking   Do you need help using the phone?  No   Are you deaf or do you have serious difficulty hearing?  No   Do you need help with transportation? No   Do you need help shopping? No   Do you need help preparing meals?  No   Do you need help with housework?  No   Do you need help with laundry? No   Do you need help taking your medications? No   Do you need help managing money? No   Do you ever drive or ride in a car without wearing a seat belt? No   Have you felt unusual stress, anger or loneliness in the last month? Yes   Who do you live with? Alone   If you need help, do you have trouble finding someone available to you? No   Have you been bothered in the last four weeks by sexual problems? No   Do you have difficulty concentrating, remembering or making decisions? No         Does the patient have evidence of cognitive impairment? No    Asprin use counseling:Does not need ASA (and currently is not on it)    Age-appropriate Screening Schedule:  Refer to the list below for future screening recommendations based on patient's age, sex and/or medical conditions. Orders for these recommended tests are listed in the plan section. The patient has been provided with a written plan.    Health Maintenance   Topic Date Due   • ZOSTER VACCINE (1 of 2) 04/22/1990   • TDAP/TD VACCINES (1 - Tdap)  01/03/2022 (Originally 4/22/1959)   • INFLUENZA VACCINE  01/18/2022 (Originally 8/1/2020)   • LIPID PANEL  12/30/2021   • DXA SCAN  12/31/2021          The following portions of the patient's history were reviewed and updated as appropriate:   He  has a past medical history of Allergic rhinitis, Decreased platelet count (CMS/HCC), Diverticular disease of colon, GERD (gastroesophageal reflux disease), History of fracture of vertebral column, Hypercholesterolemia, Hyperlipidemia, Osteoporosis, Resting tremor (12/27/2017), and Screening for malignant neoplasm of prostate.  He does not have any pertinent problems on file.  He  has a past surgical history that includes Colonoscopy (2007) and Esophageal dilation.  His family history includes Colon polyps in an other family member; Peripheral vascular disease in an other family member.  He  reports that he quit smoking about 61 years ago. He has a 5.00 pack-year smoking history. He has never used smokeless tobacco. He reports that he does not drink alcohol or use drugs.  Current Outpatient Medications   Medication Sig Dispense Refill   • amLODIPine (NORVASC) 5 MG tablet Take 1 tablet by mouth Daily. 90 tablet 1   • cetirizine (zyrTEC) 10 MG tablet Take 10 mg by mouth Daily.     • Cholecalciferol (VITAMIN D3) 5000 UNITS capsule capsule Take 5,000 Units by mouth Daily.     • fluticasone (FLONASE) 50 MCG/ACT nasal spray 1 spray into the nostril(s) as directed by provider 2 (Two) Times a Day As Needed for Rhinitis or Allergies. 1 bottle 11   • losartan (COZAAR) 100 MG tablet Take 1 tablet by mouth Daily. 90 tablet 3     No current facility-administered medications for this visit.      Current Outpatient Medications on File Prior to Visit   Medication Sig   • cetirizine (zyrTEC) 10 MG tablet Take 10 mg by mouth Daily.   • Cholecalciferol (VITAMIN D3) 5000 UNITS capsule capsule Take 5,000 Units by mouth Daily.   • fluticasone (FLONASE) 50 MCG/ACT nasal spray 1 spray into the  nostril(s) as directed by provider 2 (Two) Times a Day As Needed for Rhinitis or Allergies.   • [DISCONTINUED] amLODIPine (NORVASC) 5 MG tablet Take 5 mg by mouth 2 (Two) Times a Day.   • [DISCONTINUED] losartan (COZAAR) 25 MG tablet Take 1 tablet by mouth Daily.     No current facility-administered medications on file prior to visit.      He is allergic to lisinopril..    Outpatient Medications Prior to Visit   Medication Sig Dispense Refill   • cetirizine (zyrTEC) 10 MG tablet Take 10 mg by mouth Daily.     • Cholecalciferol (VITAMIN D3) 5000 UNITS capsule capsule Take 5,000 Units by mouth Daily.     • fluticasone (FLONASE) 50 MCG/ACT nasal spray 1 spray into the nostril(s) as directed by provider 2 (Two) Times a Day As Needed for Rhinitis or Allergies. 1 bottle 11   • amLODIPine (NORVASC) 5 MG tablet Take 5 mg by mouth 2 (Two) Times a Day.  1   • losartan (COZAAR) 25 MG tablet Take 1 tablet by mouth Daily.       No facility-administered medications prior to visit.        Patient Active Problem List   Diagnosis   • Screening for malignant neoplasm of prostate   • Osteoporosis - started Fosamax 12/2017   • Hyperlipidemia   • History of fracture of vertebral column   • GERD (gastroesophageal reflux disease)   • Diverticular disease of colon   • Allergic rhinitis   • Sinus bradycardia   • Resting tremor   • Essential hypertension   • IFG (impaired fasting glucose)       Advanced Care Planning:  ACP discussion was held with the patient during this visit. Patient has an advance directive (not in EMR), copy requested.    Review of Systems    Compared to one year ago, the patient feels his physical health is the same.  Compared to one year ago, the patient feels his mental health is the same.    Reviewed chart for potential of high risk medication in the elderly: yes  Reviewed chart for potential of harmful drug interactions in the elderly:yes    Objective         Vitals:    01/18/21 1146   Weight: 78 kg (172 lb)  "  Height: 185.4 cm (73\")   PainSc:   3   PainLoc: Back     No blood pressure obtained today with this telephone visit due to coronavirus pandemic.    Body mass index is 22.69 kg/m².  Discussed the patient's BMI with him. The BMI is in the acceptable range.    Physical Exam    Lab Results   Component Value Date    TRIG 78 12/30/2020    HDL 58 12/30/2020     (H) 12/30/2020    VLDL 14 12/30/2020    HGBA1C 5.50 12/30/2020        Assessment/Plan   Medicare Risks and Personalized Health Plan  CMS Preventative Services Quick Reference  Advance Directive Discussion  Diabetic Lab Screening   Immunizations Discussed/Encouraged (specific immunizations; adacel Tdap, Influenza and Pneumococcal 23 )  Osteoprorosis Risk    The above risks/problems have been discussed with the patient.  He is scheduled to receive coronavirus vaccination in [place] in early February.  He continues to decline tetanus, influenza, shingles and Pneumovax vaccinations.  He will continue the calcium and vitamin D supplement and weightbearing exercises.  Next DEXA will be due approximately 12/2021  He had talked to my nurse about blood pressure above goal.  I have asked him to monitor this at home following his recent televisit.  We are going to increase losartan to 100 mg daily and decrease Norvasc to 5 mg daily.  I have asked the patient to continue to pursue sodium restriction and regular walking for exercise.  He walks 3 miles daily on most days of the week.  I have asked him to continue to monitor blood pressure at home and notify me if not consistently at goal.  Pertinent information has been shared with the patient in the After Visit Summary.  Follow up plans and orders are seen below in the Assessment/Plan Section.    Diagnoses and all orders for this visit:    1. Medicare annual wellness visit, subsequent (Primary)    2. Essential hypertension    3. Age-related osteoporosis without current pathological fracture    4. Pneumococcal " vaccination declined by patient    5. Influenza vaccination declined by patient    Other orders  -     amLODIPine (NORVASC) 5 MG tablet; Take 1 tablet by mouth Daily.  Dispense: 90 tablet; Refill: 1  -     losartan (COZAAR) 100 MG tablet; Take 1 tablet by mouth Daily.  Dispense: 90 tablet; Refill: 3      Follow Up:  Return in about 1 year (around 1/18/2022) for Medicare Wellness.     An After Visit Summary and PPPS were given to the patient.

## 2021-01-18 NOTE — TELEPHONE ENCOUNTER
----- Message from Keegan Sainz MD sent at 1/18/2021 12:18 PM CST -----  Continue the amlodipine/Norvasc.  Increase losartan to 100 mg daily.  Pursue sodium restriction, if not already doing so.  Continue to monitor blood pressure at home and notify us if not at goal within 2 weeks.  EB  ----- Message -----  From: Jung Dodd RN  Sent: 1/18/2021  12:06 PM CST  To: Keegan Sainz MD    He has a telephone apt for Medicare today but then started on problems with his b/p. He just had routine visit the 4th. He states his cardiologist had wanted his systolic to stay below 140's. He states it is consistently staying above 140 and sometimes 150's. I tols him we couldn't address with the medicare visit today and I would call him back. Please advise. Jung

## 2021-01-28 ENCOUNTER — TELEPHONE (OUTPATIENT)
Dept: FAMILY MEDICINE CLINIC | Facility: CLINIC | Age: 81
End: 2021-01-28

## 2021-01-28 NOTE — TELEPHONE ENCOUNTER
Patient called and stated his BP has still been running high. He checked his blood pressure as soon as he got up and it was 161/92, he went for a walk and checked him when he got back and it was 150/87. Patient then checked BP about 11am and it was 142/78.     Patient was also talking about wanting something for depression.     He is scheduled for a telephone visit tomorrow (1/29/2021).

## 2021-01-29 ENCOUNTER — OFFICE VISIT (OUTPATIENT)
Dept: FAMILY MEDICINE CLINIC | Facility: CLINIC | Age: 81
End: 2021-01-29

## 2021-01-29 VITALS
HEIGHT: 73 IN | DIASTOLIC BLOOD PRESSURE: 91 MMHG | SYSTOLIC BLOOD PRESSURE: 148 MMHG | WEIGHT: 172 LBS | BODY MASS INDEX: 22.8 KG/M2

## 2021-01-29 DIAGNOSIS — I10 ESSENTIAL HYPERTENSION: Chronic | ICD-10-CM

## 2021-01-29 DIAGNOSIS — F43.21 GRIEF: Primary | ICD-10-CM

## 2021-01-29 DIAGNOSIS — F43.21 SITUATIONAL DEPRESSION: ICD-10-CM

## 2021-01-29 PROCEDURE — G2025 DIS SITE TELE SVCS RHC/FQHC: HCPCS | Performed by: INTERNAL MEDICINE

## 2021-01-29 RX ORDER — OLMESARTAN MEDOXOMIL AND HYDROCHLOROTHIAZIDE 40/12.5 40; 12.5 MG/1; MG/1
1 TABLET ORAL EVERY MORNING
Qty: 30 TABLET | Refills: 11 | Status: SHIPPED | OUTPATIENT
Start: 2021-01-29 | End: 2021-04-28 | Stop reason: SDUPTHER

## 2021-01-29 RX ORDER — ESCITALOPRAM OXALATE 10 MG/1
TABLET ORAL
Qty: 30 TABLET | Refills: 11 | Status: SHIPPED | OUTPATIENT
Start: 2021-01-29 | End: 2021-02-22 | Stop reason: SINTOL

## 2021-01-29 NOTE — PROGRESS NOTES
You have chosen to receive care through a telephone visit. Do you consent to use a telephone visit for your medical care today? Yes    Total visit time: 35 minutes.     Chief Complaint  Hypertension (156/90, states they have all been in the high 150's ) and Depression (wife recently passed away)    Subjective          History of Present Illness     Corey Maki receives care via telephone visit through [facility] reporting elevated blood pressure and feeling a little depressed with the grief he is experiencing after his wife passed away last month. He scores a 4 on depression screening today.  Most of the time, he sleeps fairly well, but he just feels down with her being gone after a long marriage.  We discussed trying a mild antidepressant especially through the winter months and he feels this is probably indicated.       When he had his Medicare Wellness exam, he mentioned BP was consistently staying above 140 and sometimes 150's, which he noticed a couple of months ago when his wife was starting to deteriorate prior to her death.  I had him a increase losartan to 100 mg daily and continue amlodipine/Norvasc 5 mg each evening.  His systolic continues to run consistently in the 150s.      PHQ-9 Depression Screening  Little interest or pleasure in doing things? 1   Feeling down, depressed, or hopeless? 2   Trouble falling or staying asleep, or sleeping too much? 0   Feeling tired or having little energy? 0   Poor appetite or overeating? 0   Feeling bad about yourself - or that you are a failure or have let yourself or your family down? 0   Trouble concentrating on things, such as reading the newspaper or watching television? 1   Moving or speaking so slowly that other people could have noticed? Or the opposite - being so fidgety or restless that you have been moving around a lot more than usual? 0   Thoughts that you would be better off dead, or of hurting yourself in some  "way? 0   PHQ-9 Total Score 4   If you checked off any problems, how difficult have these problems made it for you to do your work, take care of things at home, or get along with other people? Somewhat difficult       Objective   Vital Signs:   /91   Ht 185.4 cm (73\")   Wt 78 kg (172 lb)   BMI 22.69 kg/m²       Physical Exam   Result Review :     Renal Profile    Renal Profile 12/30/20   BUN 20   Creatinine 0.97   eGFR Non African Am 74           Data reviewed: PHQ-9       Future Appointments   Date Time Provider Department Center   2/22/2021 11:30 AM Keegan Sainz MD [facility]   1/11/2022  8:30 AM Keegan Sainz MD [facility]        Assessment and Plan    Problem List Items Addressed This Visit        Cardiac and Vasculature    Essential hypertension (Chronic)    Relevant Medications    olmesartan-hydrochlorothiazide (Benicar HCT) 40-12.5 MG per tablet      Other Visit Diagnoses     Grief    -  Primary    Situational depression        Relevant Medications    escitalopram (Lexapro) 10 MG tablet        For the grief reaction, I spent time with the patient discussing pharmacological and nonpharmacological interventions.  I have sent a prescription for Lexapro 10 mg to take 1/2 tablet for 1 week, progressing to a whole tablet thereafter.  If symptoms are improving in the spring, he may be able to wean off of the Lexapro.  We discussed importance of gradual weaning off of the medication instead of abruptly stopping.  Continue nonpharmacological measures to help with depression including regular/daily walking for exercise and talking with friends and family to help with the grief.     Stop the losartan and replace with Benicar HCT 40-12.5 mg to take one q.a.m.  Continue Norvasc 5 mg q.h.s.  Monitor BP and notify me if not consistently at goal. Continue regular exercise and sodium restriction.  He maintains a goal body weight.  We will repeat a telephone visit in 2-3 weeks to follow up on the " depression/BP control, etc.       Scribed for Dr. Sainz by Lorraine Patel [facility].     I spent 35 minutes caring for Corey on this date of service. This time includes time spent by me in the following activities:preparing for the visit, reviewing tests, counseling and educating the patient/family/caregiver, ordering medications, tests, or procedures and documenting information in the medical record  Follow Up   Return in about 3 weeks (around 2/19/2021), or if symptoms worsen or fail to improve.  Patient was given instructions and counseling regarding his condition or for health maintenance advice. Please see specific information pulled into the AVS if appropriate.

## 2021-02-22 ENCOUNTER — OFFICE VISIT (OUTPATIENT)
Dept: FAMILY MEDICINE CLINIC | Facility: CLINIC | Age: 81
End: 2021-02-22

## 2021-02-22 VITALS
WEIGHT: 172 LBS | DIASTOLIC BLOOD PRESSURE: 78 MMHG | HEIGHT: 73 IN | BODY MASS INDEX: 22.8 KG/M2 | SYSTOLIC BLOOD PRESSURE: 130 MMHG

## 2021-02-22 DIAGNOSIS — F43.21 GRIEF: ICD-10-CM

## 2021-02-22 DIAGNOSIS — E78.2 MIXED HYPERLIPIDEMIA: Chronic | ICD-10-CM

## 2021-02-22 DIAGNOSIS — R73.01 IFG (IMPAIRED FASTING GLUCOSE): Chronic | ICD-10-CM

## 2021-02-22 DIAGNOSIS — M25.552 LEFT HIP PAIN: ICD-10-CM

## 2021-02-22 DIAGNOSIS — T88.7XXA MEDICATION SIDE EFFECT: ICD-10-CM

## 2021-02-22 DIAGNOSIS — F43.21 SITUATIONAL DEPRESSION: ICD-10-CM

## 2021-02-22 DIAGNOSIS — M54.16 LEFT LUMBAR RADICULOPATHY: ICD-10-CM

## 2021-02-22 DIAGNOSIS — I10 ESSENTIAL HYPERTENSION: Primary | Chronic | ICD-10-CM

## 2021-02-22 PROCEDURE — G2025 DIS SITE TELE SVCS RHC/FQHC: HCPCS | Performed by: INTERNAL MEDICINE

## 2021-02-22 NOTE — PROGRESS NOTES
You have chosen to receive care through a telephone visit. Do you consent to use a telephone visit for your medical care today? Yes    Total visit time:  31 minutes.     Chief Complaint  Follow-up (3 week for b/p and depression and grief. He is confused about meds . He stopped his Lexapro because he made him very fatigued) and Hip Pain (Lumbar radiculopathy versus hip joint pain.  Sometimes improves with activity)    Subjective          History of Present Illness     Corey Maki receives care via telephone visit through [facility] to follow up on depression and grief after his wife recently passed away.  With his visit three weeks ago, I started Lexapro and recommended nonpharmacological measures including regular/daily walking for exercise and talking with friends and family to help with the grief.  He stopped the Lexapro due to increased fatigue even with the lower 5 mg dose, however, he is feeling better and scores 1 on PHQ-9 improved from 4 with his last visit.  He walks for exercise and was able to resume walking outside this morning after having been confined to his home last week due to extremely cold temperatures and snow.    We have made a couple of recent changes in BP therapy.  Blood pressure has been improved and at goal today at 130/78.  I initially increased his dose of losartan to 100 mg and decreased Norvasc 5 mg from b.i.d. to once daily. With his visit three weeks ago, after he reported BP continued to run above goal, for which I had him stop losartan and replaced with Benicar HCT 40-12.5 mg daily in addition to his Norvasc 5 mg q.h.s.  He has an old bottle of Norvasc with instructions to take twice daily, but pharmacy confirmed his new prescription has the correct instructions and patient confirms he has only been taking once daily in the evening as prescribed.       He takes Claritin D 24-hour about once every 3-4 days with good symptom relief.  He reports sputum  "is occasionally yellow in the morning, but clears through the day.  He is given reassurance.  He is asking about the impact of Claritin-D 24 on his blood pressure.    He has a history of low back pain and lumbar compression fracture.  He is now reporting left hip pain.  Dr. Matt, chiropractor has worked on this episodically.  Unclear if this is low back pain radiating to the left buttock/hip area or truly left hip pain.  He felt this may be due to inactivity, as he is accustomed to walking daily and was unable to walk last week with the winter weather.  He occasionally takes Tylenol for the pain, but not taking any analgesics daily.         He is scheduled or his 2nd COVID vaccine next week (March 2nd).        PHQ-9 Depression Screening  Little interest or pleasure in doing things? 0   Feeling down, depressed, or hopeless? 1   Trouble falling or staying asleep, or sleeping too much? 0   Feeling tired or having little energy? 0   Poor appetite or overeating? 0   Feeling bad about yourself - or that you are a failure or have let yourself or your family down? 0   Trouble concentrating on things, such as reading the newspaper or watching television? 0   Moving or speaking so slowly that other people could have noticed? Or the opposite - being so fidgety or restless that you have been moving around a lot more than usual? 0   Thoughts that you would be better off dead, or of hurting yourself in some way? 0   PHQ-9 Total Score 1   If you checked off any problems, how difficult have these problems made it for you to do your work, take care of things at home, or get along with other people? Somewhat difficult          Objective   Vital Signs:   /78   Ht 185.4 cm (73\")   Wt 78 kg (172 lb)   BMI 22.69 kg/m²       Physical Exam   Result Review :     CMP    CMP 12/30/20   Glucose 97   BUN 20   Creatinine 0.97   eGFR Non African Am 74   Sodium 139   Potassium 4.3   Chloride 104   Calcium 10.0   Albumin 4.20   Total " Bilirubin 0.9   Alkaline Phosphatase 98   AST (SGOT) 24   ALT (SGPT) 12           CBC w/diff    CBC w/Diff 12/30/20   WBC 5.99   RBC 4.51   Hemoglobin 14.0   Hematocrit 42.7   MCV 94.7   MCH 31.0   MCHC 32.8   RDW 13.4   Platelets 143   Neutrophil Rel % 61.0   Lymphocyte Rel % 30.6   Monocyte Rel % 6.0   Eosinophil Rel % 2.2   Basophil Rel % 0.2           Data reviewed: Radiologic studies Lumbar 2013       Future Appointments   Date Time Provider Department Center   1/11/2022  8:30 AM Keegan Sainz MD [facility]         Assessment and Plan    Diagnoses and all orders for this visit:    1. Essential hypertension (Primary)    2. Grief    3. Situational depression    4. Left hip pain    5. Left lumbar radiculopathy    6. Mixed hyperlipidemia    7. IFG (impaired fasting glucose)    8. Medication side effect           We visited for a long time today on the phone, allowing him to talk through where he is currently regarding grief and loneliness.  We presented some coping skills.  He can stay off of the Lexapro. He continues to grieve, but feels he is adequately coping. With the improving weather conditions, he was able to resume walking for exercise this morning with the improving weather conditions.   Continue nonpharmacological measures to help with depression including regular/daily walking for exercise and talking/visiting with friends and family to help with the grief and loneliness.  Notify us if he feels worsening depression/grief symptoms.  We discussed ways for him to get more social interaction.    Continue the current blood pressure regimen.  I asked him to glynn on his older bottle of Norvasc to take only once daily each evening, as he has been doing.  He verbalizes understanding.  Continue the Benicar HCT each morning.  Continue sodium restriction and regular exercise.  Monitor BP and heart rate at rest and notify me if not consistently at goal.  Continue walking for exercise.    Decrease Claritin 24  hour to  Claritin D 12-hour in the a.m. rarely, hopefully no more often than every 3 or 4 days.  Pay close attention to blood pressure on days he is taking the decongestant to monitor for elevated BP.  I believe the 24-hour version would have excessive decongestant for this patient and produce side effect of worsened hypertension    For the fatigue side effect produced with low-dose Lexapro 5 mg daily, I support his decision to discontinue the medication.  He does not desire trying anything else at this time.  I told him to feel free to contact me in the future should he notice persistent/extended grief or depression.  We discussed these issues extensively today.    He can take Tylenol per label directions for the lower back/left hip pain. He will notify us if the current pain flare persists.  He may benefit from water aerobics and the Silver sneakers program once he is fully vaccinated for COVID.  He is scheduled to have his 2nd COVID vaccine next week.       Return 01/2022 for routine follow up with fasting labs one week prior or sooner if needed.      Scribed for Dr. Sainz by Lorraine Patel [facility].     Follow Up   Return if symptoms worsen or fail to improve, for Next scheduled follow up.  Patient was given instructions and counseling regarding his condition or for health maintenance advice. Please see specific information pulled into the AVS if appropriate.

## 2021-03-23 ENCOUNTER — BULK ORDERING (OUTPATIENT)
Dept: CASE MANAGEMENT | Facility: OTHER | Age: 81
End: 2021-03-23

## 2021-03-23 DIAGNOSIS — Z23 IMMUNIZATION DUE: ICD-10-CM

## 2021-04-29 RX ORDER — OLMESARTAN MEDOXOMIL AND HYDROCHLOROTHIAZIDE 40/12.5 40; 12.5 MG/1; MG/1
1 TABLET ORAL EVERY MORNING
Qty: 30 TABLET | Refills: 11 | Status: SHIPPED | OUTPATIENT
Start: 2021-04-29 | End: 2022-01-12 | Stop reason: ALTCHOICE

## 2021-05-13 DIAGNOSIS — R39.15 URINARY URGENCY: ICD-10-CM

## 2021-05-13 DIAGNOSIS — R30.0 DYSURIA: Primary | ICD-10-CM

## 2021-05-14 ENCOUNTER — LAB (OUTPATIENT)
Dept: LAB | Facility: OTHER | Age: 81
End: 2021-05-14

## 2021-05-14 ENCOUNTER — OFFICE VISIT (OUTPATIENT)
Dept: FAMILY MEDICINE CLINIC | Facility: CLINIC | Age: 81
End: 2021-05-14

## 2021-05-14 VITALS
DIASTOLIC BLOOD PRESSURE: 82 MMHG | SYSTOLIC BLOOD PRESSURE: 136 MMHG | TEMPERATURE: 97 F | WEIGHT: 178 LBS | HEIGHT: 73 IN | HEART RATE: 60 BPM | BODY MASS INDEX: 23.59 KG/M2

## 2021-05-14 DIAGNOSIS — J30.1 SEASONAL ALLERGIC RHINITIS DUE TO POLLEN: Chronic | ICD-10-CM

## 2021-05-14 DIAGNOSIS — I10 ESSENTIAL HYPERTENSION: Chronic | ICD-10-CM

## 2021-05-14 DIAGNOSIS — R09.89 GLOBUS SENSATION: ICD-10-CM

## 2021-05-14 DIAGNOSIS — T50.905A MEDICATION SIDE EFFECT, INITIAL ENCOUNTER: ICD-10-CM

## 2021-05-14 DIAGNOSIS — R30.0 DYSURIA: ICD-10-CM

## 2021-05-14 DIAGNOSIS — R33.9 URINARY RETENTION: Primary | ICD-10-CM

## 2021-05-14 DIAGNOSIS — R39.15 URINARY URGENCY: ICD-10-CM

## 2021-05-14 DIAGNOSIS — R09.82 POSTNASAL DRIP: ICD-10-CM

## 2021-05-14 PROBLEM — R00.1 BRADYCARDIA: Status: ACTIVE | Noted: 2019-09-03

## 2021-05-14 PROBLEM — M54.50 LOW BACK PAIN: Status: ACTIVE | Noted: 2019-09-03

## 2021-05-14 LAB
BILIRUB UR QL STRIP: NEGATIVE
CLARITY UR: CLEAR
COLOR UR: YELLOW
GLUCOSE UR STRIP-MCNC: NEGATIVE MG/DL
HGB UR QL STRIP.AUTO: NEGATIVE
KETONES UR QL STRIP: NEGATIVE
LEUKOCYTE ESTERASE UR QL STRIP.AUTO: NEGATIVE
NITRITE UR QL STRIP: NEGATIVE
PH UR STRIP.AUTO: 6 [PH] (ref 5.5–8)
PROT UR QL STRIP: NEGATIVE
SP GR UR STRIP: 1.01 (ref 1–1.03)
UROBILINOGEN UR QL STRIP: NORMAL

## 2021-05-14 PROCEDURE — 81003 URINALYSIS AUTO W/O SCOPE: CPT | Performed by: INTERNAL MEDICINE

## 2021-05-14 PROCEDURE — 99214 OFFICE O/P EST MOD 30 MIN: CPT | Performed by: INTERNAL MEDICINE

## 2021-05-14 RX ORDER — MONTELUKAST SODIUM 10 MG/1
10 TABLET ORAL NIGHTLY PRN
Qty: 30 TABLET | Refills: 1 | COMMUNITY
Start: 2021-05-14

## 2021-05-14 NOTE — PROGRESS NOTES
"Chief Complaint  Difficulty Urinating (x last 2 weeks. Difficulty getting started usually in the morning )    Subjective          History of Present Illness     Corey Maki presents to the clinic today reporting symptoms of urinating retention and hesitency, worse in the morning.  He reports nocturia x 0-1 on most nights.  In discussion, he has been taking Claritin D 12 hour between 6 to 9:00 p.m. daily.  Onset of the urinary retention symptoms coincides with when he started taking the Claritin-D 12 in the evening.  Urinalysis this morning is normal indicating symptoms are more than likely due to adverse side effects from the antihistamine/decongestant.       He had started experiencing increased postnasal drainage and describes some episodic globus sensation.  He saw Dr. Miles, ENT, who started patient on Singulair and referred to Dr. Corbett, general surgery, for EGD later this month by Dr. Corbett to evaluate the globus sensation.   He denies classic GERD symptoms, but  also started him on a reflux medication.  It sounds like it was a PPI or H2 blocker, but he is not sure.  He will bring the name of the medication with his next appointment.  He is not convinced that he needs to take anything for GERD.      Objective   Vital Signs:   /82   Pulse 60   Temp 97 °F (36.1 °C) (Infrared)   Ht 185.4 cm (73\")   Wt 80.7 kg (178 lb)   BMI 23.48 kg/m²         Physical Exam  Constitutional:       General: He is not in acute distress.     Appearance: He is well-developed.      Comments: Pleasant male.    HENT:      Head: Normocephalic and atraumatic.      Nose: Nose normal.      Mouth/Throat:      Pharynx: No oropharyngeal exudate.   Eyes:      Pupils: Pupils are equal, round, and reactive to light.   Neck:      Thyroid: No thyromegaly.      Vascular: No JVD.   Cardiovascular:      Rate and Rhythm: Normal rate and regular rhythm.      Heart sounds: Normal heart sounds.   Pulmonary:      Effort: Pulmonary " effort is normal. No accessory muscle usage or respiratory distress.      Breath sounds: Normal breath sounds. No wheezing or rales.   Abdominal:      General: Bowel sounds are normal. There is no distension.      Palpations: Abdomen is soft.      Tenderness: There is no abdominal tenderness.   Musculoskeletal:      Cervical back: Neck supple.   Lymphadenopathy:      Cervical: No cervical adenopathy.   Neurological:      Mental Status: He is alert and oriented to person, place, and time.      Cranial Nerves: No cranial nerve deficit.   Psychiatric:         Speech: Speech normal.         Behavior: Behavior normal.         Thought Content: Thought content normal.         Judgment: Judgment normal.            Result Review :{Labs  Result Review  Imaging  Med Tab  Media  Procedures :23}     UA    Urinalysis 5/14/21   Specific [place], UA 1.015   Ketones, UA Negative   Blood, UA Negative   Leukocytes, UA Negative   Nitrite, UA Negative                  Future Appointments   Date Time Provider Department Center   1/11/2022  8:30 AM Keegan Sainz MD [facility]        Assessment and Plan    Diagnoses and all orders for this visit:    1. Urinary retention (Primary)    2. Medication side effect, initial encounter    3. Seasonal allergic rhinitis due to pollen    4. Postnasal drip    5. Globus sensation    6. Essential hypertension         I spent 30 minutes caring for Corey on this date of service. This time includes time spent by me in the following activities:preparing for the visit, reviewing tests, obtaining and/or reviewing a separately obtained history, performing a medically appropriate examination and/or evaluation , counseling and educating the patient/family/caregiver, ordering medications, tests, or procedures, documenting information in the medical record, independently interpreting results and communicating that information with the patient/family/caregiver and care coordination     Urinary retention  symptoms appear to be due to side effect of antihistamine/decongestant use and should resolve with stopping the Claritin-D 12.  Avoid all decongestants and add plain Claritin 10 mg daily as needed, watching for similar side effects.  Prior to starting the Claritin-D 12, he was not experiencing significant BPH symptoms or nocturia or urinary hesitancy.      To help manage seasonal allergy symptoms adequately, I also recommended he increase Flonase to use one spray each nostril b.i.d.  Continue the Singulair, although, I did recommend he move from morning to evening dosing of Singulair.  He can continue sinus irrigation episodically as recommended by Dr Miles.  I asked him to bring in all medications with his next visit so we can update his medication list.     Keep appointment with Dr. Corbett later this month to evaluate globus sensation and evaluate for evidence of GERD.  Return in January for routine follow up with fasting labs one week prior or sooner if needed.       Follow Up   Return if symptoms worsen or fail to improve, for Next scheduled follow up, Next scheduled follow up - labs 1 week prior.  Patient was given instructions and counseling regarding his condition or for health maintenance advice. Please see specific information pulled into the AVS if appropriate.

## 2021-07-26 ENCOUNTER — OFFICE VISIT (OUTPATIENT)
Dept: FAMILY MEDICINE CLINIC | Facility: CLINIC | Age: 81
End: 2021-07-26

## 2021-07-26 VITALS
OXYGEN SATURATION: 99 % | TEMPERATURE: 96 F | DIASTOLIC BLOOD PRESSURE: 80 MMHG | SYSTOLIC BLOOD PRESSURE: 138 MMHG | BODY MASS INDEX: 23.59 KG/M2 | HEIGHT: 73 IN | HEART RATE: 60 BPM | WEIGHT: 178 LBS

## 2021-07-26 DIAGNOSIS — N40.1 BENIGN PROSTATIC HYPERPLASIA WITH WEAK URINARY STREAM: Chronic | ICD-10-CM

## 2021-07-26 DIAGNOSIS — M81.0 AGE-RELATED OSTEOPOROSIS WITHOUT CURRENT PATHOLOGICAL FRACTURE: Chronic | ICD-10-CM

## 2021-07-26 DIAGNOSIS — R39.12 BENIGN PROSTATIC HYPERPLASIA WITH WEAK URINARY STREAM: Chronic | ICD-10-CM

## 2021-07-26 DIAGNOSIS — M54.42 CHRONIC BILATERAL LOW BACK PAIN WITH LEFT-SIDED SCIATICA: Chronic | ICD-10-CM

## 2021-07-26 DIAGNOSIS — R09.82 POSTNASAL DRIP: Primary | ICD-10-CM

## 2021-07-26 DIAGNOSIS — G89.29 CHRONIC BILATERAL LOW BACK PAIN WITH LEFT-SIDED SCIATICA: Chronic | ICD-10-CM

## 2021-07-26 DIAGNOSIS — S32.040D COMPRESSION FRACTURE OF L4 VERTEBRA WITH ROUTINE HEALING, SUBSEQUENT ENCOUNTER: ICD-10-CM

## 2021-07-26 PROCEDURE — 99214 OFFICE O/P EST MOD 30 MIN: CPT | Performed by: INTERNAL MEDICINE

## 2021-07-26 RX ORDER — LANSOPRAZOLE 30 MG/1
30 CAPSULE, DELAYED RELEASE ORAL DAILY
COMMUNITY
Start: 2021-06-30 | End: 2021-10-08 | Stop reason: ALTCHOICE

## 2021-07-26 NOTE — PROGRESS NOTES
Chief Complaint  Cough (chronic x months. Mainly dry.  States has phlegm but sometimes has color to it. He had seen Dr. Corbett on 05/28 for something simular to this and he preformed an EGD)    Subjective          History of Present Illness     Corey Maki presents to the clinic to follow up on chronic, postnasal drip and mild, episodic mostly nonproductive cough, although, cough is rarely productive of clear mucous.  He does admit the postnasal drainage has gradually improved over the past week.  Dr. Miles, ENT, evaluated patient, reinforcing treatment for chronic postnasal drip, but also  referred patient to Dr. Rajan Corbett for EGD to evaluate for possible GERD or dysphagia component.  The EGD revealed no evidence of significant GERD or gastritis. There was small hiatal hernia noted.  No  lesions noted on oropharyngeal scoping by Dr. Miles.   He uses Flonase 1-2 spray once daily and daily saline sinus rinse.  He has not tried daily antihistamine recently.   We discussed a strategy to help manage the chronic postnasal drip.  Earlier this year, he had issues with urinary retention when he was taking Claritin-D 12 each morning.    Patient reports chronic low back pain radiating into the left buttock.  He states he had MRI in [place] sometime in early 2020 revealingd history of 4 compression fractures in the lower thoracic and lumbar region over past several years.  He had chiropractic manipulations with Dr. Matt in the past without significant improvement. He is reporting mild pain and some balance issues, although, he feels pain is manageable with taking 2 extra strength Tylenol midmorning on some days.  He usually goes dancing 3 to 4 nights weekly without difficulty, although, he has not done this for a while due to COVID.  X-rays of lumbar spine 04/2013 revealed compression fractures of L2 and L4.  He continues on weekly Fosamax and vitamin and calcium to treat osteoporosis.  DEXA 12/31/2019 revealed normal  "density of the lumbar spine, but persistent osteoporosis of the hip.  His risk of hip fracture would be approximately 20% if he had a significant fall.  This hip density has improved by 9.8% over prior  2 years.  We discussed ways to help reduce his fall risk and also help the chronic low back issues, but he declines referral to physical therapy, but does agree to repeat the lumbar x-rays to evaluate for any new recent compression fractures.     Objective   Vital Signs:   /80   Pulse 60   Temp 96 °F (35.6 °C) (Tympanic)   Ht 185.4 cm (73\")   Wt 80.7 kg (178 lb)   SpO2 99%   BMI 23.48 kg/m²       Physical Exam  Constitutional:       General: He is not in acute distress.     Appearance: He is well-developed.      Comments: Pleasant male.    HENT:      Head: Normocephalic and atraumatic.      Nose: Nose normal.      Mouth/Throat:      Pharynx: No oropharyngeal exudate.      Comments: Clear postnasal drainage in oropharynx.   Eyes:      Pupils: Pupils are equal, round, and reactive to light.   Neck:      Thyroid: No thyromegaly.      Vascular: No JVD.   Cardiovascular:      Rate and Rhythm: Normal rate and regular rhythm.      Heart sounds: Normal heart sounds.   Pulmonary:      Effort: Pulmonary effort is normal. No accessory muscle usage or respiratory distress.      Breath sounds: Normal breath sounds. No wheezing or rales.   Abdominal:      General: Bowel sounds are normal. There is no distension.      Palpations: Abdomen is soft.      Tenderness: There is no abdominal tenderness.   Musculoskeletal:      Cervical back: Neck supple.   Lymphadenopathy:      Cervical: No cervical adenopathy.   Neurological:      Mental Status: He is alert and oriented to person, place, and time.      Cranial Nerves: No cranial nerve deficit.   Psychiatric:         Speech: Speech normal.         Behavior: Behavior normal.         Thought Content: Thought content normal.         Judgment: Judgment normal.            Result " Review :     Common labs    Common Labsle 12/30/20 12/30/20 12/30/20 12/30/20 12/30/20    0823 0823 0823 0823 0823   Glucose  97      BUN  20      Creatinine  0.97      eGFR Non African Am  74      Sodium  139      Potassium  4.3      Chloride  104      Calcium  10.0      Albumin  4.20      Total Bilirubin  0.9      Alkaline Phosphatase  98      AST (SGOT)  24      ALT (SGPT)  12      WBC 5.99       Hemoglobin 14.0       Hematocrit 42.7       Platelets 143       Total Cholesterol   204 (A)     Triglycerides   78     HDL Cholesterol   58     LDL Cholesterol    132 (A)     Hemoglobin A1C    5.50    PSA     0.267   (A) Abnormal value            Data reviewed: Radiologic studies DEXA 2019 and prior lumbar x-rays and Dr. Corbett consult note       Future Appointments   Date Time Provider Department Center   1/11/2022  8:30 AM Keegan Sainz MD [facility]         Assessment and Plan    Diagnoses and all orders for this visit:    1. Postnasal drip (Primary)    2. Chronic bilateral low back pain with left-sided sciatica  -     XR Spine Lumbar Complete 4+VW    3. Age-related osteoporosis without current pathological fracture    4. Compression fracture of L4 vertebra with routine healing, subsequent encounter  -     XR Spine Lumbar Complete 4+VW    5. Benign prostatic hyperplasia with weak urinary stream - h/o urinary retention with Claritin-D         I spent 35 minutes caring for Corey on this date of service. This time includes time spent by me in the following activities:preparing for the visit, reviewing tests, obtaining and/or reviewing a separately obtained history, performing a medically appropriate examination and/or evaluation , counseling and educating the patient/family/caregiver, ordering medications, tests, or procedures, documenting information in the medical record and independently interpreting results and communicating that information with the patient/family/caregiver     For the chronic postnasal drip,  I recommended adding saline nasal spray two sprays each nostril  3-4 times daily, using the Flonase one spray each nostril b.i.d.  He can use the sinus irrigation when needed, but does not need to use daily.    For his worse allergy days, I recommended low-dose OTC Allegra 60 mg daily as needed for seasonal allergy symptoms.  Monitor closely for urinary retention with the Allegra.     Orders placed for patient to to stop by for x-rays of the lumbar spine today.  We will contact patient with results.  I also asked patient to see if he can get us more information on the date of the most recent MRI.  Dr. Matt, chiropractor, has a copy of the disc.  Patient did not directly discuss his history of osteoporosis with Dr. Matt and I recommended he make him aware of this if he schedules another chiropractic appointment.   Avoid activities which flare the back pain.  We discussed things that might help his steadiness/balance and reduce fall risk.   He may benefit from yoga classes at the local fitness center to improve balance.  I recommended he use the walking stick with ambulation to help prevent falls.  I recommended referral to physical therapy, but he declines.  He seems willing to consider that in the future if necessary.      Continue current treatment for osteoporosis including weekly Fosamax and daily calcium +D supplements.  We will repeat DEXA in December of this year.     Return in January for routine follow up with fasting labs one week prior or sooner if needed.       Scribed for Dr. Sainz by Lorraine Patel [facility].     Follow Up   Return if symptoms worsen or fail to improve, for Next scheduled follow up, Next scheduled follow up - labs 1 week prior.  Patient was given instructions and counseling regarding his condition or for health maintenance advice. Please see specific information pulled into the AVS if appropriate.

## 2021-07-27 NOTE — PROGRESS NOTES
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  A little worse than 2019.  He is going to have chronic pain from this.  When the local pain clinic gets a new pain management doctor, that might be a very good idea.  EB

## 2021-08-02 PROBLEM — R39.12 BENIGN PROSTATIC HYPERPLASIA WITH WEAK URINARY STREAM: Chronic | Status: ACTIVE | Noted: 2021-08-02

## 2021-08-02 PROBLEM — N40.1 BENIGN PROSTATIC HYPERPLASIA WITH WEAK URINARY STREAM: Chronic | Status: ACTIVE | Noted: 2021-08-02

## 2021-10-08 ENCOUNTER — OFFICE VISIT (OUTPATIENT)
Dept: FAMILY MEDICINE CLINIC | Facility: CLINIC | Age: 81
End: 2021-10-08

## 2021-10-08 VITALS
BODY MASS INDEX: 24.23 KG/M2 | HEART RATE: 53 BPM | HEIGHT: 73 IN | OXYGEN SATURATION: 97 % | DIASTOLIC BLOOD PRESSURE: 92 MMHG | TEMPERATURE: 96.3 F | WEIGHT: 182.8 LBS | SYSTOLIC BLOOD PRESSURE: 146 MMHG

## 2021-10-08 DIAGNOSIS — J30.1 SEASONAL ALLERGIC RHINITIS DUE TO POLLEN: ICD-10-CM

## 2021-10-08 DIAGNOSIS — M81.0 AGE-RELATED OSTEOPOROSIS WITHOUT CURRENT PATHOLOGICAL FRACTURE: Chronic | ICD-10-CM

## 2021-10-08 DIAGNOSIS — R05.3 CHRONIC COUGH: ICD-10-CM

## 2021-10-08 DIAGNOSIS — G89.29 CHRONIC BILATERAL LOW BACK PAIN WITH LEFT-SIDED SCIATICA: Chronic | ICD-10-CM

## 2021-10-08 DIAGNOSIS — R06.09 DOE (DYSPNEA ON EXERTION): Primary | ICD-10-CM

## 2021-10-08 DIAGNOSIS — M54.42 CHRONIC BILATERAL LOW BACK PAIN WITH LEFT-SIDED SCIATICA: Chronic | ICD-10-CM

## 2021-10-08 PROCEDURE — 99214 OFFICE O/P EST MOD 30 MIN: CPT | Performed by: INTERNAL MEDICINE

## 2021-10-08 RX ORDER — OMEPRAZOLE 20 MG/1
20 CAPSULE, DELAYED RELEASE ORAL DAILY
COMMUNITY
End: 2022-03-16 | Stop reason: ALTCHOICE

## 2021-10-08 NOTE — PROGRESS NOTES
Chief Complaint  Shortness of Breath (wheeze x 1 month   phlegm in throat), Cough (dry x several months . States doesnt take Flonase. Singulair or Zyrtec all the time ), and Pain (ocassional pain under left arm, shoulder but states he sleeps on that side )    Subjective          History of Present Illness     Corey Maki presents to the clinic reporting persistent chronic, postnasal drip and mostly nonproductive cough, sometimes productive and some morning drainage.  He is also noticing dyspnea on exertion and episodes of wheezing.  Denies lower extremity edema.  In August,  I recommended adding saline nasal spray two sprays each nostril  3-4 times daily and using the Flonase one spray each nostril b.i.d.  He has not been using the Flonase nasal spray daily as prescribed.  He continues daily use of Singulair. He was fully vaccinated for COVID with 2nd dose of Pfizer being given 03/02/2021.  Dr. Rajan Corbett ordered EGD revealingd no evidence of significant GERD or gastritis. There was small hiatal hernia noted.  No  lesions noted on oropharyngeal scoping by Dr. Miles.  We have to use caution with decongestants, as he had issues with urinary retention when he was taking Claritin-D 12 each morning earlier this year.  He is able to empty his bladder fully, but sometimes continue to have some occasional urinary hesitancy.       He feels his exercise tolerance has decreased since his back pain is limiting his exercise.   He can tolerate riding a stationary bike for exercise.  He has been extremely physically active in the past and continues to walk for exercise, but is not able to walk daily due to the chronic back pain, which has actually improved.  He is receiving chiropractic manipulation with some improvement.  He reports pain after weightbearing for about an hour, for which he can get relief with stretching and core exercises.        Objective   Vital Signs:   /92   Pulse 53   Temp 96.3 °F (35.7 °C)  "(Tympanic)   Ht 185.4 cm (73\")   Wt 82.9 kg (182 lb 12.8 oz)   SpO2 97%   BMI 24.12 kg/m²       Physical Exam  Constitutional:       General: He is not in acute distress.     Appearance: He is well-developed.   HENT:      Head: Normocephalic and atraumatic.      Nose: Nose normal.      Mouth/Throat:      Pharynx: No oropharyngeal exudate.   Eyes:      Pupils: Pupils are equal, round, and reactive to light.   Neck:      Thyroid: No thyromegaly.      Vascular: No JVD.   Cardiovascular:      Rate and Rhythm: Normal rate and regular rhythm.      Heart sounds: Normal heart sounds.   Pulmonary:      Effort: Pulmonary effort is normal. No accessory muscle usage or respiratory distress.      Breath sounds: Normal breath sounds. No wheezing or rales.      Comments: A few interstitial lung sounds, right greater than left.   Abdominal:      General: Bowel sounds are normal. There is no distension.      Palpations: Abdomen is soft.      Tenderness: There is no abdominal tenderness.   Musculoskeletal:      Cervical back: Neck supple.   Lymphadenopathy:      Cervical: No cervical adenopathy.   Neurological:      Mental Status: He is alert and oriented to person, place, and time.      Cranial Nerves: No cranial nerve deficit.   Psychiatric:         Speech: Speech normal.         Behavior: Behavior normal.         Thought Content: Thought content normal.         Judgment: Judgment normal.            Result Review :     Common labs    Common Labsle 12/30/20 12/30/20 12/30/20 12/30/20 12/30/20    0823 0823 0823 0823 0823   Glucose  97      BUN  20      Creatinine  0.97      eGFR Non African Am  74      Sodium  139      Potassium  4.3      Chloride  104      Calcium  10.0      Albumin  4.20      Total Bilirubin  0.9      Alkaline Phosphatase  98      AST (SGOT)  24      ALT (SGPT)  12      WBC 5.99       Hemoglobin 14.0       Hematocrit 42.7       Platelets 143       Total Cholesterol   204 (A)     Triglycerides   78     HDL " Cholesterol   58     LDL Cholesterol    132 (A)     Hemoglobin A1C    5.50    PSA     0.267   (A) Abnormal value            Data reviewed: Radiologic studies Chest x-ray and DEXA           Future Appointments   Date Time Provider Department Center   1/11/2022  8:30 AM Keegan Sainz MD [facility]     Assessment and Plan    Diagnoses and all orders for this visit:    1. ALFARO (dyspnea on exertion) (Primary)  -     XR Chest PA & Lateral  -     Ambulatory Referral to Pulmonology    2. Seasonal allergic rhinitis due to pollen    3. Chronic cough  -     XR Chest PA & Lateral  -     Ambulatory Referral to Pulmonology    4. Chronic bilateral low back pain with left-sided sciatica    5. Age-related osteoporosis without current pathological fracture         I spent 31 minutes caring for Corey on this date of service. This time includes time spent by me in the following activities:preparing for the visit, reviewing tests, obtaining and/or reviewing a separately obtained history, performing a medically appropriate examination and/or evaluation , counseling and educating the patient/family/caregiver, ordering medications, tests, or procedures, documenting information in the medical record and independently interpreting results and communicating that information with the patient/family/caregiver     Refer patient to Dr. Abdulaziz Monet, pulmonology, to evaluate dyspnea on exertion and chronic cough.  I sent an order for patient to stop by for chest x-ray today.   I recommended he use the Flonase nasal spray one spray each nostril b.i.d. using saline nasal spray several times daily.  Continue daily use of Singulair.  We have to avoid decongestants due to urinary retention with use of decongestants earlier this year.  If no pulmonology etiology is found for the dyspnea on exertion, we will refer to cardiology.      Continue the core exercises, which seem to provide improvement in his back pain.  Continue chiropractic maniplation.  He may benefit from low impact exercise such as Silver Sneakers.     I want to repeat DEXA soon to reassess his osteoporosis this winter.  His DEXA 12/2019 demonstrated significant improvement with Fosamax, but Fosamax is no longer on his medication list.  We need to clarify/address this with his next visit.    Return in January for routine follow up with fasting labs one week prior or sooner if needed.    Scribed for Dr. Sainz by Lorraine Patel [facility].       Follow Up   Return if symptoms worsen or fail to improve, for Next scheduled follow up.  Patient was given instructions and counseling regarding his condition or for health maintenance advice. Please see specific information pulled into the AVS if appropriate.

## 2021-10-11 RX ORDER — AMLODIPINE BESYLATE 5 MG/1
5 TABLET ORAL NIGHTLY
Qty: 90 TABLET | Refills: 3 | Status: SHIPPED | OUTPATIENT
Start: 2021-10-11 | End: 2022-01-12 | Stop reason: SDUPTHER

## 2022-01-06 ENCOUNTER — LAB (OUTPATIENT)
Dept: LAB | Facility: OTHER | Age: 82
End: 2022-01-06

## 2022-01-06 DIAGNOSIS — I10 ESSENTIAL HYPERTENSION: ICD-10-CM

## 2022-01-06 DIAGNOSIS — E55.9 VITAMIN D DEFICIENCY: ICD-10-CM

## 2022-01-06 DIAGNOSIS — Z12.5 SCREENING FOR PROSTATE CANCER: ICD-10-CM

## 2022-01-06 DIAGNOSIS — E78.2 MIXED HYPERLIPIDEMIA: ICD-10-CM

## 2022-01-06 DIAGNOSIS — E78.2 MIXED HYPERLIPIDEMIA: Primary | ICD-10-CM

## 2022-01-06 LAB
ALBUMIN SERPL-MCNC: 4.3 G/DL (ref 3.5–5)
ALBUMIN/GLOB SERPL: 1.3 G/DL (ref 1.1–1.8)
ALP SERPL-CCNC: 90 U/L (ref 38–126)
ALT SERPL W P-5'-P-CCNC: 14 U/L
ANION GAP SERPL CALCULATED.3IONS-SCNC: 7 MMOL/L (ref 5–15)
AST SERPL-CCNC: 24 U/L (ref 17–59)
BASOPHILS # BLD AUTO: 0.02 10*3/MM3 (ref 0–0.2)
BASOPHILS NFR BLD AUTO: 0.3 % (ref 0–1.5)
BILIRUB SERPL-MCNC: 0.6 MG/DL (ref 0.2–1.3)
BUN SERPL-MCNC: 26 MG/DL (ref 7–23)
BUN/CREAT SERPL: 23.4 (ref 7–25)
CALCIUM SPEC-SCNC: 9.9 MG/DL (ref 8.4–10.2)
CHLORIDE SERPL-SCNC: 105 MMOL/L (ref 101–112)
CHOLEST SERPL-MCNC: 188 MG/DL (ref 150–200)
CO2 SERPL-SCNC: 28 MMOL/L (ref 22–30)
CREAT SERPL-MCNC: 1.11 MG/DL (ref 0.7–1.3)
DEPRECATED RDW RBC AUTO: 43.8 FL (ref 37–54)
EOSINOPHIL # BLD AUTO: 0.08 10*3/MM3 (ref 0–0.4)
EOSINOPHIL NFR BLD AUTO: 1.1 % (ref 0.3–6.2)
ERYTHROCYTE [DISTWIDTH] IN BLOOD BY AUTOMATED COUNT: 13.2 % (ref 12.3–15.4)
GFR SERPL CREATININE-BSD FRML MDRD: 64 ML/MIN/1.73 (ref 42–98)
GLOBULIN UR ELPH-MCNC: 3.4 GM/DL (ref 2.3–3.5)
GLUCOSE SERPL-MCNC: 106 MG/DL (ref 70–99)
HCT VFR BLD AUTO: 42.5 % (ref 37.5–51)
HDLC SERPL-MCNC: 50 MG/DL (ref 40–59)
HGB BLD-MCNC: 14.1 G/DL (ref 13–17.7)
LDLC SERPL CALC-MCNC: 122 MG/DL
LDLC/HDLC SERPL: 2.4 {RATIO} (ref 0–3.55)
LYMPHOCYTES # BLD AUTO: 1.84 10*3/MM3 (ref 0.7–3.1)
LYMPHOCYTES NFR BLD AUTO: 26.4 % (ref 19.6–45.3)
MCH RBC QN AUTO: 30.7 PG (ref 26.6–33)
MCHC RBC AUTO-ENTMCNC: 33.2 G/DL (ref 31.5–35.7)
MCV RBC AUTO: 92.6 FL (ref 79–97)
MONOCYTES # BLD AUTO: 0.52 10*3/MM3 (ref 0.1–0.9)
MONOCYTES NFR BLD AUTO: 7.5 % (ref 5–12)
NEUTROPHILS NFR BLD AUTO: 4.51 10*3/MM3 (ref 1.7–7)
NEUTROPHILS NFR BLD AUTO: 64.7 % (ref 42.7–76)
PLATELET # BLD AUTO: 146 10*3/MM3 (ref 140–450)
PMV BLD AUTO: 12.6 FL (ref 6–12)
POTASSIUM SERPL-SCNC: 4 MMOL/L (ref 3.4–5)
PROT SERPL-MCNC: 7.7 G/DL (ref 6.3–8.6)
RBC # BLD AUTO: 4.59 10*6/MM3 (ref 4.14–5.8)
SODIUM SERPL-SCNC: 140 MMOL/L (ref 137–145)
TRIGL SERPL-MCNC: 89 MG/DL
VLDLC SERPL-MCNC: 16 MG/DL (ref 5–40)
WBC NRBC COR # BLD: 6.97 10*3/MM3 (ref 3.4–10.8)

## 2022-01-06 PROCEDURE — 84439 ASSAY OF FREE THYROXINE: CPT | Performed by: INTERNAL MEDICINE

## 2022-01-06 PROCEDURE — 82306 VITAMIN D 25 HYDROXY: CPT | Performed by: INTERNAL MEDICINE

## 2022-01-06 PROCEDURE — 80061 LIPID PANEL: CPT | Performed by: INTERNAL MEDICINE

## 2022-01-06 PROCEDURE — 36415 COLL VENOUS BLD VENIPUNCTURE: CPT | Performed by: INTERNAL MEDICINE

## 2022-01-06 PROCEDURE — 80053 COMPREHEN METABOLIC PANEL: CPT | Performed by: INTERNAL MEDICINE

## 2022-01-06 PROCEDURE — 85025 COMPLETE CBC W/AUTO DIFF WBC: CPT | Performed by: INTERNAL MEDICINE

## 2022-01-06 PROCEDURE — G0103 PSA SCREENING: HCPCS | Performed by: INTERNAL MEDICINE

## 2022-01-07 LAB
25(OH)D3 SERPL-MCNC: 70.1 NG/ML
PSA SERPL-MCNC: 0.52 NG/ML (ref 0–4)
T4 FREE SERPL-MCNC: 1.3 NG/DL (ref 0.93–1.7)

## 2022-01-12 ENCOUNTER — OFFICE VISIT (OUTPATIENT)
Dept: FAMILY MEDICINE CLINIC | Facility: CLINIC | Age: 82
End: 2022-01-12

## 2022-01-12 VITALS
OXYGEN SATURATION: 97 % | DIASTOLIC BLOOD PRESSURE: 80 MMHG | BODY MASS INDEX: 24.52 KG/M2 | WEIGHT: 185 LBS | HEIGHT: 73 IN | TEMPERATURE: 97.5 F | SYSTOLIC BLOOD PRESSURE: 138 MMHG | HEART RATE: 64 BPM

## 2022-01-12 DIAGNOSIS — E78.2 MIXED HYPERLIPIDEMIA: Chronic | ICD-10-CM

## 2022-01-12 DIAGNOSIS — G89.29 CHRONIC BILATERAL LOW BACK PAIN WITH LEFT-SIDED SCIATICA: Chronic | ICD-10-CM

## 2022-01-12 DIAGNOSIS — M81.0 AGE-RELATED OSTEOPOROSIS WITHOUT CURRENT PATHOLOGICAL FRACTURE: Chronic | ICD-10-CM

## 2022-01-12 DIAGNOSIS — R05.3 CHRONIC COUGH: Chronic | ICD-10-CM

## 2022-01-12 DIAGNOSIS — R73.01 IFG (IMPAIRED FASTING GLUCOSE): Chronic | ICD-10-CM

## 2022-01-12 DIAGNOSIS — K21.9 GASTROESOPHAGEAL REFLUX DISEASE WITHOUT ESOPHAGITIS: Chronic | ICD-10-CM

## 2022-01-12 DIAGNOSIS — Z12.5 SCREENING FOR PROSTATE CANCER: ICD-10-CM

## 2022-01-12 DIAGNOSIS — I10 ESSENTIAL HYPERTENSION: Primary | Chronic | ICD-10-CM

## 2022-01-12 DIAGNOSIS — M54.42 CHRONIC BILATERAL LOW BACK PAIN WITH LEFT-SIDED SCIATICA: Chronic | ICD-10-CM

## 2022-01-12 PROBLEM — R09.82 POSTNASAL DRIP: Chronic | Status: ACTIVE | Noted: 2022-01-12

## 2022-01-12 PROCEDURE — 99215 OFFICE O/P EST HI 40 MIN: CPT | Performed by: INTERNAL MEDICINE

## 2022-01-12 RX ORDER — AMLODIPINE BESYLATE 10 MG/1
10 TABLET ORAL NIGHTLY
Qty: 90 TABLET | Refills: 3 | Status: SHIPPED | OUTPATIENT
Start: 2022-01-12 | End: 2023-01-03 | Stop reason: SDUPTHER

## 2022-01-12 NOTE — PROGRESS NOTES
Chief Complaint  Follow-up (12 month and labs)    Subjective          History of Present Illness     Corey Maki presents to the clinic for annual follow up of hypertension, GERD, hyperlipidemia, and osteoporosis among other issues. Patient's wife passed a way a few years ago and patient has been dating, which appears to provide patient with companionship.          In November, Dr. Monet, pulmolonologist, addressed patient's chronic cough and chronic dyspnea on exertion, found to be most likely multifactorial with upper airway cough syndrome and GERD.  He started patient on Allegra 180 and Protonix 40 mg q.a.m.  He recommended he continue the Singulair and move his Flonase dose to nighttime instead of morning.   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  Patient feels the chronic cough is improved, although, he continues to struggle with a lot of morning postnasal drainage  Dr. Monet ordered sed rate, which is 10.  Rheumatoid factor was negative.  Patient uses sinus rinse each morning and has resumed twice daily dosing of the Flonase. He runs a humidifier in the home during the daytime, but not during the nighttime hours.      He has a history of low back pain and lumbar compression fracture.  His DEXA 12/2019 demonstrated significant improvement with Fosamax, which patient has stopped.  He is in agreement to repeat the DEXA, but he declines any prescription medication treatment for osteoporosis.  He understands that he is at increased risk of fracture.  He walks nearly every day for exercise.      Weight is up 4 pounds in the past year. Patient continues to walk for exercise, approximately 3 miles three days weekly.  BP is 138/80 today in the office. He reports BP runs similar with home monitoring, although, systolic was 142 this morning.  Patient reports he has stopped the Benicar HCT and increased  "his Norvasc 5 mg to b.i.d.  He felt the Benicar HCT caused some increased joint pain.  Denies increased swelling with the higher dose Norvasc.     The patient's relevant past medical, surgical, and social history was reviewed in Epic.   Lab results are reviewed with the patient today. CBC unremarkable. Fasting glucose 106.  Normal thyroid screen. Normal renal and liver function. Total and LDL cholesterol improved.  HDL good at 50 giving him a favorable cholesterol ratio 2.4.    PSA reveals no evidence of prostate cancer.     Objective   Vital Signs:   /80   Pulse 64   Temp 97.5 °F (36.4 °C)   Ht 185.4 cm (73\")   Wt 83.9 kg (185 lb)   SpO2 97%   BMI 24.41 kg/m²       Physical Exam  Vitals reviewed.   Constitutional:       General: He is not in acute distress.     Appearance: He is well-developed.      Comments: Pleasant male.    HENT:      Head: Normocephalic and atraumatic.      Nose:      Right Sinus: No maxillary sinus tenderness or frontal sinus tenderness.      Left Sinus: No maxillary sinus tenderness or frontal sinus tenderness.      Mouth/Throat:      Mouth: No oral lesions.      Pharynx: Uvula midline.      Tonsils: No tonsillar exudate.   Eyes:      Conjunctiva/sclera: Conjunctivae normal.      Pupils: Pupils are equal, round, and reactive to light.   Neck:      Thyroid: No thyroid mass or thyromegaly.      Vascular: No carotid bruit or JVD.      Trachea: Trachea normal. No tracheal deviation.   Cardiovascular:      Rate and Rhythm: Normal rate and regular rhythm.  No extrasystoles are present.     Chest Wall: PMI is not displaced.      Heart sounds: Normal heart sounds. No murmur heard.      Pulmonary:      Effort: Pulmonary effort is normal. No accessory muscle usage or respiratory distress.      Breath sounds: Normal breath sounds. No decreased breath sounds, wheezing, rhonchi or rales.      Comments: Chronic interstitial lung sounds at the bases bilaterally.    Abdominal:      General: Bowel " sounds are normal. There is no distension.      Palpations: Abdomen is soft.      Tenderness: There is no abdominal tenderness.   Musculoskeletal:      Cervical back: Neck supple.   Lymphadenopathy:      Cervical: No cervical adenopathy.   Skin:     General: Skin is warm and dry.      Findings: No rash.      Nails: There is no clubbing.   Neurological:      Mental Status: He is alert and oriented to person, place, and time.      Cranial Nerves: No cranial nerve deficit.      Coordination: Coordination normal.   Psychiatric:         Speech: Speech normal.         Behavior: Behavior normal.         Thought Content: Thought content normal.         Judgment: Judgment normal.            Result Review :     CMP    CMP 1/6/22   Glucose 106 (A)   BUN 26 (A)   Creatinine 1.11   eGFR Non African Am 64   Sodium 140   Potassium 4.0   Chloride 105   Calcium 9.9   Albumin 4.30   Total Bilirubin 0.6   Alkaline Phosphatase 90   AST (SGOT) 24   ALT (SGPT) 14   (A) Abnormal value            CBC w/diff    CBC w/Diff 1/6/22   WBC 6.97   RBC 4.59   Hemoglobin 14.1   Hematocrit 42.5   MCV 92.6   MCH 30.7   MCHC 33.2   RDW 13.2   Platelets 146   Neutrophil Rel % 64.7   Lymphocyte Rel % 26.4   Monocyte Rel % 7.5   Eosinophil Rel % 1.1   Basophil Rel % 0.3           Lipid Panel    Lipid Panel 1/6/22   Total Cholesterol 188   Triglycerides 89   HDL Cholesterol 50   VLDL Cholesterol 16   LDL Cholesterol  122 (A)   LDL/HDL Ratio 2.40   (A) Abnormal value                    PSA    PSA 1/6/22   PSA 0.524           Data reviewed: Radiologic studies CT chest 12/21/2021 and Cardiology studies Dr. Abdulaziz Monet, pulmonology          Assessment and Plan    Diagnoses and all orders for this visit:    1. Essential hypertension (Primary)  -     CBC Auto Differential; Future  -     Comprehensive Metabolic Panel; Future  -     Lipid Panel; Future  -     TSH; Future    2. IFG (impaired fasting glucose)  -     Hemoglobin A1c; Future    3. Gastroesophageal  reflux disease without esophagitis    4. Age-related osteoporosis without current pathological fracture  -     DEXA Bone Density Axial; Future  -     TSH; Future  -     Vitamin D 25 Hydroxy; Future    5. Chronic bilateral low back pain with left-sided sciatica    6. Chronic cough    7. Screening for prostate cancer  -     PSA Screen; Future    8. Mixed hyperlipidemia  -     Lipid Panel; Future    Other orders  -     amLODIPine (NORVASC) 10 MG tablet; Take 1 tablet by mouth Every Night.  Dispense: 90 tablet; Refill: 3         I spent 43   minutes caring for Corey on this date of service. This time includes time spent by me in the following activities:preparing for the visit, reviewing tests, obtaining and/or reviewing a separately obtained history, performing a medically appropriate examination and/or evaluation , counseling and educating the patient/family/caregiver, ordering medications, tests, or procedures and documenting information in the medical record     Patient can continue his current strategy to manage BP for now.  I sent a prescription for the higher dose Norvasc 10 mg to take one q.d.  He can remain off of the Benicar for now.  Monitor BP and notify me if not consistently at goal.       I placed order for patient to repeat DEXA in this patient with history of lumbar compression fracture.  We will notify patient with results.  Continue the vitamin D.    Patient declines flu vaccine. He has been fully vaccinated for COVID including booster.        Cholesterol well controlled with his HDL 50 giving him a favorable cholesterol ratio.  Continue to address the lipids with diet and walking for exercise.    Continue the Prilosec for GERD.     To help address the chronic postnasal drainage, mostly morning time, I recommended he continue the morning nasal irrigation.  I recommended he run a humidifier in the bedroom continuously during the winter heating months.  Continue the Singulair and antihistamine.          Return in one year for annual exam with fasting labs one week prior.       Scribed for Dr. Sainz by Lorraine Patel [facility].       Follow Up   Return in about 1 year (around 1/12/2023) for Next scheduled follow up - labs 1 week prior.  Patient was given instructions and counseling regarding his condition or for health maintenance advice. Please see specific information pulled into the AVS if appropriate.

## 2022-01-20 ENCOUNTER — TELEPHONE (OUTPATIENT)
Dept: FAMILY MEDICINE CLINIC | Facility: CLINIC | Age: 82
End: 2022-01-20

## 2022-01-20 NOTE — TELEPHONE ENCOUNTER
----- Message from Keegan Sainz MD sent at 1/20/2022  9:20 AM CST -----  Please inform Corey that he continues to have normal density of the lumbar spine, but continued osteoporosis of the hip.  The density has not changed significantly over the course of the past 2 years.  We discussed his osteoporosis with recent visit and he declines any prescription medication to treat it.  Continue to take the vitamin D supplement and continue to try to maintain good dietary calcium levels.  Continue the walking for exercise.  If he changes his mind about taking osteoporosis medication, ask him to call us.  We will plan on repeat DEXA in 2 years to reevaluate.  EB

## 2022-02-01 ENCOUNTER — TELEPHONE (OUTPATIENT)
Dept: FAMILY MEDICINE CLINIC | Facility: CLINIC | Age: 82
End: 2022-02-01

## 2022-02-01 NOTE — TELEPHONE ENCOUNTER
Relayed new instructions and he voiced understanding. TP            ----- Message from Keegan Sainz MD sent at 2/1/2022 12:47 PM CST -----  Inform him that blood pressure is to be monitored at rest.  Blood pressure after exertion is not used to address medications.  Monitor blood pressure and heart rate 2-3 times daily for the next week, recording the results and drop it off next week.  EB  ----- Message -----  From: Jung Dodd RN  Sent: 2/1/2022  11:52 AM CST  To: Keegan Sainz MD    He states his b/p has been slightly elevated 144/81, 156/89 after exertion currently. He takes Amlodipine 10 mg daily and he wanted to take an extra half of Amlodipine and I told him no. He really  Doesn't want to come in. TP

## 2022-02-18 RX ORDER — CARVEDILOL 3.12 MG/1
3.12 TABLET ORAL 2 TIMES DAILY
COMMUNITY
Start: 2022-02-15 | End: 2023-02-16

## 2022-03-07 ENCOUNTER — LAB (OUTPATIENT)
Dept: LAB | Facility: OTHER | Age: 82
End: 2022-03-07

## 2022-03-07 ENCOUNTER — TRANSCRIBE ORDERS (OUTPATIENT)
Dept: LAB | Facility: OTHER | Age: 82
End: 2022-03-07

## 2022-03-07 DIAGNOSIS — I10 ESSENTIAL HYPERTENSION: Primary | ICD-10-CM

## 2022-03-07 DIAGNOSIS — I10 ESSENTIAL HYPERTENSION: ICD-10-CM

## 2022-03-07 LAB
ANION GAP SERPL CALCULATED.3IONS-SCNC: 7 MMOL/L (ref 5–15)
BUN SERPL-MCNC: 21 MG/DL (ref 7–23)
BUN/CREAT SERPL: 19.1 (ref 7–25)
CALCIUM SPEC-SCNC: 9.5 MG/DL (ref 8.4–10.2)
CHLORIDE SERPL-SCNC: 102 MMOL/L (ref 101–112)
CO2 SERPL-SCNC: 29 MMOL/L (ref 22–30)
CREAT SERPL-MCNC: 1.1 MG/DL (ref 0.7–1.3)
EGFRCR SERPLBLD CKD-EPI 2021: 67.4 ML/MIN/1.73
GLUCOSE SERPL-MCNC: 129 MG/DL (ref 70–99)
POTASSIUM SERPL-SCNC: 4.3 MMOL/L (ref 3.4–5)
SODIUM SERPL-SCNC: 138 MMOL/L (ref 137–145)

## 2022-03-07 PROCEDURE — 80048 BASIC METABOLIC PNL TOTAL CA: CPT | Performed by: INTERNAL MEDICINE

## 2022-03-07 PROCEDURE — 36415 COLL VENOUS BLD VENIPUNCTURE: CPT | Performed by: INTERNAL MEDICINE

## 2022-03-16 ENCOUNTER — OFFICE VISIT (OUTPATIENT)
Dept: FAMILY MEDICINE CLINIC | Facility: CLINIC | Age: 82
End: 2022-03-16

## 2022-03-16 VITALS
HEART RATE: 52 BPM | DIASTOLIC BLOOD PRESSURE: 70 MMHG | WEIGHT: 183.8 LBS | HEIGHT: 73 IN | TEMPERATURE: 96 F | BODY MASS INDEX: 24.36 KG/M2 | SYSTOLIC BLOOD PRESSURE: 146 MMHG

## 2022-03-16 DIAGNOSIS — R22.1 MASS OF LEFT SIDE OF NECK: ICD-10-CM

## 2022-03-16 DIAGNOSIS — R07.89 ATYPICAL CHEST PAIN: ICD-10-CM

## 2022-03-16 DIAGNOSIS — I10 ESSENTIAL HYPERTENSION: Chronic | ICD-10-CM

## 2022-03-16 DIAGNOSIS — M54.2 NECK PAIN: ICD-10-CM

## 2022-03-16 DIAGNOSIS — Z00.00 MEDICARE ANNUAL WELLNESS VISIT, SUBSEQUENT: Primary | ICD-10-CM

## 2022-03-16 DIAGNOSIS — R00.1 SINUS BRADYCARDIA: ICD-10-CM

## 2022-03-16 DIAGNOSIS — Z09 HOSPITAL DISCHARGE FOLLOW-UP: ICD-10-CM

## 2022-03-16 PROBLEM — R07.2 PRECORDIAL PAIN: Status: ACTIVE | Noted: 2022-02-14

## 2022-03-16 PROCEDURE — 1159F MED LIST DOCD IN RCRD: CPT | Performed by: INTERNAL MEDICINE

## 2022-03-16 PROCEDURE — G0439 PPPS, SUBSEQ VISIT: HCPCS | Performed by: INTERNAL MEDICINE

## 2022-03-16 RX ORDER — LOSARTAN POTASSIUM 25 MG/1
25 TABLET ORAL DAILY
COMMUNITY
Start: 2022-03-01 | End: 2022-03-16 | Stop reason: SDUPTHER

## 2022-03-16 RX ORDER — LOSARTAN POTASSIUM 25 MG/1
25 TABLET ORAL 2 TIMES DAILY
Qty: 180 TABLET | Refills: 3 | Status: SHIPPED | OUTPATIENT
Start: 2022-03-16 | End: 2023-01-19 | Stop reason: SINTOL

## 2022-03-16 RX ORDER — PANTOPRAZOLE SODIUM 40 MG/1
1 TABLET, DELAYED RELEASE ORAL DAILY
COMMUNITY
Start: 2022-02-26

## 2022-03-16 NOTE — PATIENT INSTRUCTIONS
Medicare Wellness  Personal Prevention Plan of Service     Date of Office Visit:    Encounter Provider:  Keegan Sainz MD  Place of Service:  [facility] [place]  Patient Name: Corey Maki  :  1940    As part of the Medicare Wellness portion of your visit today, we are providing you with this personalized preventive plan of services (PPPS). This plan is based upon recommendations of the [place] Preventive Services Task Force (USPSTF) and the Advisory Committee on Immunization Practices (ACIP).    This lists the preventive care services that should be considered, and provides dates of when you are due. Items listed as completed are up-to-date and do not require any further intervention.    Health Maintenance   Topic Date Due    TDAP/TD VACCINES (1 - Tdap) Never done    ZOSTER VACCINE (1 of 2) Never done    Pneumococcal Vaccine 65+ (1 of 1 - PPSV23) Never done    INFLUENZA VACCINE  2021    LIPID PANEL  2023    ANNUAL WELLNESS VISIT  2023    DXA SCAN  2024    COVID-19 Vaccine  Completed       No orders of the defined types were placed in this encounter.      Return for Next scheduled follow up.

## 2022-03-16 NOTE — PROGRESS NOTES
"Chief Complaint  Medicare Wellness-subsequent (FYI. Patient was admitted to [facility] 02/14 for precordial pain and is under the care of Dr. Choi. Denies any further pain) and Neck Pain (Off and on x several months . States when he shaves and razor passes over left side of neck he has a sore area )    Subjective          History of Present Illness     Corey Maki presents to to the office reporting waxing and waning symptoms of sore area(questionable mass) on left side the neck, most prominent when shaving and resolving immediately.  Episodes occur several times weekly and have been going on for a few months.  Denies neck pain today. No mass or lympadenopathy on neck exam today.       He was hospitalized overnight at [facility] 02/14/2022 for chest pain. Dr. Choi, cardiology, was consulted.  Echo and stress test performed without evidence inducible/reversible ischemia.  ECHO 02/2022 revealed ejection fraction of 60-65%. Low-dose carvedilol 3.125 mg b.i.d. was added with discharge.  BP is above goal in the office today at 146/70 with Norvasc 10 mg q.d., Coreg 3.125 mg b.i.d., and losartan 25 mg daily.  Systolic is normally in the low 140s with home monitoring.  I recommended we increase his dose of losartan 25 mg to b.i.d.     He also completes subsequent Medicare wellness exam today.  He rides an exercise bike 5 miles on days he does not get to walk for exercise.  Patient declines Shingrix, Pneumovax, flu, and TDAP.        Objective   Vital Signs:   /70   Pulse 52   Temp 96 °F (35.6 °C) (Tympanic)   Ht 185.4 cm (73\")   Wt 83.4 kg (183 lb 12.8 oz)   BMI 24.25 kg/m²       Physical Exam  Vitals reviewed.   Constitutional:       General: He is not in acute distress.     Appearance: He is well-developed.      Comments: Pleasant male.    HENT:      Head: Normocephalic and atraumatic.      Nose:      Right Sinus: No maxillary sinus tenderness or frontal sinus tenderness.      Left Sinus: No maxillary sinus tenderness " or frontal sinus tenderness.      Mouth/Throat:      Mouth: No oral lesions.      Pharynx: Uvula midline.      Tonsils: No tonsillar exudate.   Eyes:      Conjunctiva/sclera: Conjunctivae normal.      Pupils: Pupils are equal, round, and reactive to light.   Neck:      Thyroid: No thyroid mass or thyromegaly.      Vascular: No carotid bruit or JVD.      Trachea: Trachea normal. No tracheal deviation.   Cardiovascular:      Rate and Rhythm: Normal rate and regular rhythm.  No extrasystoles are present.     Chest Wall: PMI is not displaced.      Heart sounds: Normal heart sounds. No murmur heard.  Pulmonary:      Effort: Pulmonary effort is normal. No accessory muscle usage or respiratory distress.      Breath sounds: Normal breath sounds. No decreased breath sounds, wheezing, rhonchi or rales.   Abdominal:      General: Bowel sounds are normal. There is no distension.      Palpations: Abdomen is soft.      Tenderness: There is no abdominal tenderness.   Musculoskeletal:      Cervical back: Neck supple.   Lymphadenopathy:      Cervical: No cervical adenopathy.   Skin:     General: Skin is warm and dry.      Findings: No rash.      Nails: There is no clubbing.   Neurological:      Mental Status: He is alert and oriented to person, place, and time.      Cranial Nerves: No cranial nerve deficit.      Coordination: Coordination normal.   Psychiatric:         Speech: Speech normal.         Behavior: Behavior normal.         Thought Content: Thought content normal.         Judgment: Judgment normal.        Future Appointments   Date Time Provider Department Center   3/30/2022  2:00 PM [facility]  1 [facility] [place]   1/19/2023 11:30 AM Keegan Sainz MD [facility]  [facility]       Result Review :     Common labs    Common Labsle 1/6/22 1/6/22 1/6/22 1/6/22 2/14/22 3/7/22    0812 0812 0812 0812     Glucose   106 (A)   129 (A)   Glucose     101    BUN   26 (A)  18 21   Creatinine   1.11  1.1 1.10   eGFR Non African Am   64       Sodium   140  138 138   Potassium   4.0  4.3 4.3   Chloride   105  104 102   Calcium   9.9  9.7 9.5   Albumin   4.30  4.2    Total Bilirubin   0.6  0.65    Alkaline Phosphatase   90  78    AST (SGOT)   24  15    ALT (SGPT)   14  7    WBC 6.97        Hemoglobin 14.1        Hematocrit 42.5        Platelets 146        Total Cholesterol  188       Triglycerides  89       HDL Cholesterol  50       LDL Cholesterol   122 (A)       PSA    0.524     (A) Abnormal value            Data reviewed: Cardiology studies ECHO 02/2022 and Recent hospitalization notes [facility] 02/14/2022          Assessment and Plan    Diagnoses and all orders for this visit:    1. Medicare annual wellness visit, subsequent (Primary)    2. Hospital discharge follow-up    3. Neck pain  -     US Head Neck Soft Tissue    4. Essential hypertension    5. Mass of left side of neck  -     US Head Neck Soft Tissue    6. Atypical chest pain    7. Sinus bradycardia    Other orders  -     losartan (COZAAR) 25 MG tablet; Take 1 tablet by mouth 2 (Two) Times a Day.  Dispense: 180 tablet; Refill: 3             Patient completes subsequent Medicare Wellness exam today.  He continues to decline immunizations.  He continues to get regular exercise and maintain a goal body weight. .       Order is placed for patient to schedule ultrasound of head and neck to evaluate questionable mass left side of the neck.  We will notify patient with results when available.     To address the new problem of atypical chest pain and to address the inadequate control of his hypertension, I sent a prescription for patient to increase the losartan 25 mg to b.i.d.  Continue the Coreg 3.125 mg b.i.d.  Monitor BP and notify me if not consistently at goal.   Keep follow up appointments with Dr. Choi, cardiology, as scheduled.   Notify me if this issue persists.    Sinus bradycardia is a new problem.  This patient's resting heart rate is typically in the upper 50s to low 60s due to regular  exercise.  Since cardiology added the Coreg for his atypical chest pain, resting heart rate is now in the low 50s, but he seems to be tolerating it well thus far.  Not sure this is the best long-term medication for him, particularly since he passed a stress test with flying colors.  I defer to cardiology at this point.    Return 01/2023 for annual follow up with fasting labs one week prior or sooner if needed.     Scribed for Dr. Sainz by Lorraine Patel [facility].     Follow Up   Return for Next scheduled follow up.  Patient was given instructions and counseling regarding his condition or for health maintenance advice. Please see specific information pulled into the AVS if appropriate.

## 2022-03-16 NOTE — PROGRESS NOTES
The ABCs of the Annual Wellness Visit  Subsequent Medicare Wellness Visit    Chief Complaint   Patient presents with   • Medicare Wellness-subsequent     FYI. Patient was admitted to [facility] 02/14 for precordial pain and is under the care of Dr. Choi. Denies any further pain   • Neck Pain     Off and on x several months . States when he shaves and razor passes over left side of neck he has a sore area       Subjective    History of Present Illness:  Corey Maki is a 81 y.o. male who presents for a Subsequent Medicare Wellness Visit.    The following portions of the patient's history were reviewed and   updated as appropriate:   He  has a past medical history of Allergic rhinitis, Decreased platelet count (HCC), Diverticular disease of colon, GERD (gastroesophageal reflux disease), History of fracture of vertebral column, Hyperlipidemia, Osteoporosis, Resting tremor (12/27/2017), and Screening for malignant neoplasm of prostate.  He does not have any pertinent problems on file.  He  has a past surgical history that includes Colonoscopy (2007) and Esophageal dilation.  His family history includes Colon polyps in an other family member; Peripheral vascular disease in an other family member.  He  reports that he quit smoking about 62 years ago. He has a 5.00 pack-year smoking history. He has never used smokeless tobacco. He reports that he does not drink alcohol and does not use drugs.  Current Outpatient Medications   Medication Sig Dispense Refill   • amLODIPine (NORVASC) 10 MG tablet Take 1 tablet by mouth Every Night. 90 tablet 3   • carvedilol (COREG) 3.125 MG tablet Take 3.125 mg by mouth 2 (Two) Times a Day.     • cetirizine (zyrTEC) 10 MG tablet Take 10 mg by mouth Daily.     • Cholecalciferol (VITAMIN D3) 5000 UNITS capsule capsule Take 5,000 Units by mouth Daily.     • fluticasone (FLONASE) 50 MCG/ACT nasal spray 1 spray into the nostril(s) as directed by provider 2 (Two) Times a Day As Needed for Rhinitis or  Allergies. 1 bottle 11   • losartan (COZAAR) 25 MG tablet Take 25 mg by mouth Daily.     • montelukast (SINGULAIR) 10 MG tablet Take 10 mg by mouth At Night As Needed. 30 tablet 1   • pantoprazole (PROTONIX) 40 MG EC tablet Take 1 tablet by mouth Daily.       No current facility-administered medications for this visit.     Current Outpatient Medications on File Prior to Visit   Medication Sig   • amLODIPine (NORVASC) 10 MG tablet Take 1 tablet by mouth Every Night.   • carvedilol (COREG) 3.125 MG tablet Take 3.125 mg by mouth 2 (Two) Times a Day.   • cetirizine (zyrTEC) 10 MG tablet Take 10 mg by mouth Daily.   • Cholecalciferol (VITAMIN D3) 5000 UNITS capsule capsule Take 5,000 Units by mouth Daily.   • fluticasone (FLONASE) 50 MCG/ACT nasal spray 1 spray into the nostril(s) as directed by provider 2 (Two) Times a Day As Needed for Rhinitis or Allergies.   • losartan (COZAAR) 25 MG tablet Take 25 mg by mouth Daily.   • montelukast (SINGULAIR) 10 MG tablet Take 10 mg by mouth At Night As Needed.   • pantoprazole (PROTONIX) 40 MG EC tablet Take 1 tablet by mouth Daily.   • [DISCONTINUED] omeprazole (priLOSEC) 20 MG capsule Take 20 mg by mouth Daily.     No current facility-administered medications on file prior to visit.     He is allergic to lisinopril..    Compared to one year ago, the patient feels his physical   health is worse.    Compared to one year ago, the patient feels his mental   health is the same.    Recent Hospitalizations:  He was admitted within the past 365 days at [facility].       Current Medical Providers:  Patient Care Team:  Keegan Sainz MD as PCP - General (Internal Medicine)    Outpatient Medications Prior to Visit   Medication Sig Dispense Refill   • amLODIPine (NORVASC) 10 MG tablet Take 1 tablet by mouth Every Night. 90 tablet 3   • carvedilol (COREG) 3.125 MG tablet Take 3.125 mg by mouth 2 (Two) Times a Day.     • cetirizine (zyrTEC) 10 MG tablet Take 10 mg by mouth Daily.     •  Cholecalciferol (VITAMIN D3) 5000 UNITS capsule capsule Take 5,000 Units by mouth Daily.     • fluticasone (FLONASE) 50 MCG/ACT nasal spray 1 spray into the nostril(s) as directed by provider 2 (Two) Times a Day As Needed for Rhinitis or Allergies. 1 bottle 11   • losartan (COZAAR) 25 MG tablet Take 25 mg by mouth Daily.     • montelukast (SINGULAIR) 10 MG tablet Take 10 mg by mouth At Night As Needed. 30 tablet 1   • pantoprazole (PROTONIX) 40 MG EC tablet Take 1 tablet by mouth Daily.     • omeprazole (priLOSEC) 20 MG capsule Take 20 mg by mouth Daily.       No facility-administered medications prior to visit.       No opioid medication identified on active medication list. I have reviewed chart for other potential  high risk medication/s and harmful drug interactions in the elderly.          Aspirin is not on active medication list.  Aspirin use is not indicated based on review of current medical condition/s. Risk of harm outweighs potential benefits.  .    Patient Active Problem List   Diagnosis   • Screening for malignant neoplasm of prostate   • Osteoporosis - started Fosamax 12/2017   • History of fracture of vertebral column   • GERD (gastroesophageal reflux disease)   • Diverticular disease of colon   • Allergic rhinitis   • Sinus bradycardia   • Resting tremor   • Essential hypertension   • IFG (impaired fasting glucose)   • Bradycardia   • Chronic low back pain with left-sided sciatica   • Benign prostatic hyperplasia with weak urinary stream - h/o urinary retention with Claritin-D   • Chronic cough -multifactorial, PND, upper airway syndrome, possible GERD.  Dr. Monet   • Postnasal drip -chronic   • Precordial pain     Advance Care Planning  Advance Directive is not on file.  ACP discussion was held with the patient during this visit. Patient has an advance directive (not in EMR), copy requested.          Objective    Vitals:    03/16/22 1409   BP: 146/70   Pulse: 52   Temp: 96 °F (35.6 °C)   TempSrc:  "Tympanic   Weight: 83.4 kg (183 lb 12.8 oz)   Height: 185.4 cm (73\")   PainSc:   3   PainLoc: Neck     BMI Readings from Last 1 Encounters:   22 24.25 kg/m²   BMI is within normal parameters. No follow-up required.    Does the patient have evidence of cognitive impairment? No    Physical Exam  Lab Results   Component Value Date     2022    TRIG 89 2022    HDL 50 2022     (H) 2022    VLDL 16 2022            HEALTH RISK ASSESSMENT    Smoking Status:  Social History     Tobacco Use   Smoking Status Former Smoker   • Packs/day: 0.50   • Years: 10.00   • Pack years: 5.00   • Quit date:    • Years since quittin.2   Smokeless Tobacco Never Used     Alcohol Consumption:  Social History     Substance and Sexual Activity   Alcohol Use No     Fall Risk Screen:    JOHNY Fall Risk Assessment was completed, and patient is at LOW risk for falls.Assessment completed on:3/16/2022    Depression Screening:  PHQ-2/PHQ-9 Depression Screening 3/16/2022   Retired Total Score -   Little Interest or Pleasure in Doing Things 0-->not at all   Feeling Down, Depressed or Hopeless 0-->not at all   PHQ-9: Brief Depression Severity Measure Score 0       Health Habits and Functional and Cognitive Screening:  Functional & Cognitive Status 3/16/2022   Do you have difficulty preparing food and eating? No   Do you have difficulty bathing yourself, getting dressed or grooming yourself? No   Do you have difficulty using the toilet? No   Do you have difficulty moving around from place to place? No   Do you have trouble with steps or getting out of a bed or a chair? No   Current Diet Well Balanced Diet   Dental Exam Up to date   Eye Exam Up to date   Exercise (times per week) 4 times per week   Current Exercises Include Walking   Current Exercise Activities Include -   Do you need help using the phone?  No   Are you deaf or do you have serious difficulty hearing?  Yes   Do you need help with " transportation? No   Do you need help shopping? No   Do you need help preparing meals?  No   Do you need help with housework?  No   Do you need help with laundry? No   Do you need help taking your medications? No   Do you need help managing money? No   Do you ever drive or ride in a car without wearing a seat belt? No   Have you felt unusual stress, anger or loneliness in the last month? Yes   Who do you live with? Alone   If you need help, do you have trouble finding someone available to you? No   Have you been bothered in the last four weeks by sexual problems? No   Do you have difficulty concentrating, remembering or making decisions? No       Age-appropriate Screening Schedule:  Refer to the list below for future screening recommendations based on patient's age, sex and/or medical conditions. Orders for these recommended tests are listed in the plan section. The patient has been provided with a written plan.    Health Maintenance   Topic Date Due   • TDAP/TD VACCINES (1 - Tdap) Never done   • ZOSTER VACCINE (1 of 2) Never done   • INFLUENZA VACCINE  08/01/2021   • LIPID PANEL  01/06/2023   • DXA SCAN  01/14/2024              Assessment/Plan   CMS Preventative Services Quick Reference  Risk Factors Identified During Encounter  Cardiovascular Disease  Chronic Pain   Immunizations Discussed/Encouraged (specific Immunizations; Tdap, Influenza, Pneumococcal 23 and Shingrix  Activity/regular exercise       Patient continues to decline immunizations.  He continues to get regular exercise and maintain a goal body weight. .         The above risks/problems have been discussed with the patient.  Follow up actions/plans if indicated are seen below in the Assessment/Plan Section.  Pertinent information has been shared with the patient in the After Visit Summary.    Diagnoses and all orders for this visit:    1. Medicare annual wellness visit, subsequent (Primary)    2. Hospital discharge follow-up    3. Neck pain    4.  Essential hypertension        Follow Up:   Return for Next scheduled follow up.     An After Visit Summary and PPPS were made available to the patient.        I spent 11 minutes caring for Corey on this date of service. This time includes time spent by me in the following activities:preparing for the visit, performing a medically appropriate examination and/or evaluation , counseling and educating the patient/family/caregiver, ordering medications, tests, or procedures and documenting information in the medical record

## 2022-06-29 ENCOUNTER — OFFICE VISIT (OUTPATIENT)
Dept: FAMILY MEDICINE CLINIC | Facility: CLINIC | Age: 82
End: 2022-06-29

## 2022-06-29 VITALS — WEIGHT: 183 LBS | BODY MASS INDEX: 24.25 KG/M2 | HEIGHT: 73 IN

## 2022-06-29 DIAGNOSIS — J32.0 CHRONIC MAXILLARY SINUSITIS: Primary | Chronic | ICD-10-CM

## 2022-06-29 DIAGNOSIS — H70.92 MASTOIDITIS OF LEFT SIDE: ICD-10-CM

## 2022-06-29 DIAGNOSIS — R39.12 BENIGN PROSTATIC HYPERPLASIA WITH WEAK URINARY STREAM: Chronic | ICD-10-CM

## 2022-06-29 DIAGNOSIS — J31.0 CHRONIC RHINITIS: Chronic | ICD-10-CM

## 2022-06-29 DIAGNOSIS — J30.1 SEASONAL ALLERGIC RHINITIS DUE TO POLLEN: Chronic | ICD-10-CM

## 2022-06-29 DIAGNOSIS — N40.1 BENIGN PROSTATIC HYPERPLASIA WITH WEAK URINARY STREAM: Chronic | ICD-10-CM

## 2022-06-29 DIAGNOSIS — R09.82 POSTNASAL DRIP: Chronic | ICD-10-CM

## 2022-06-29 PROCEDURE — G2025 DIS SITE TELE SVCS RHC/FQHC: HCPCS | Performed by: INTERNAL MEDICINE

## 2022-06-29 RX ORDER — CIPROFLOXACIN 500 MG/1
500 TABLET, FILM COATED ORAL 2 TIMES DAILY
COMMUNITY
End: 2022-07-26

## 2022-06-29 NOTE — PROGRESS NOTES
"You have chosen to receive care through a telephone visit. Do you consent to use a telephone visit for your medical care today? Yes    Chief Complaint  URI (Sinus congestion/drainage but worse at night and the mornings. Now he is having ear discomfort. He has seen Dr. Miles several times for this. He has had scans and Dr. Miles did a culture but was told he didn't have any infection. When he swallows he hears a roaring in ears. He has tried antibiotics x 2 and steroids x 1 month and is currently Cipro and steroid marni. ) and Medication Problem (He states he uses Flonase but not Zyrtec or Claritin. States those meds make it difficult to urinate)    Subjective        History of Present Illness     Corey Maki receives care via telephone visit reporting chronic sinus congestion.   He has seen Dr. Miles, ENT, and is currently on a course of Cipro with 12 days remaining.  He has had 2 prior course of antibiotics as well.  Oral steroids added 2 days ago.  He has not taken the Cipro for the past 2 days after the steroids were added.  He did not tolerate antihistamines due to urinary retention.  He is doing sinus irrigation with medicated packet added daily and always boils the water prior to irrigation. Patient uses Flonase one spray each nostril once daily.  Patient had CT sinuses 06/16/2022 revealing dependent secretions right maxillary antrum, and likely some left mastoiditis, otherwise clear paranasal sinuses.  There were inflammatory changes left middle ear.  Dr. Miles has scheduled patient for audiometry exam.  Patient also saw Dr. Abdulaziz Monet, pulmonolgist, who ordered allergen panel 11/30/2022 demonstrating positive IGE D FARINA 7.01 and D. PTERONYSSINUS 4.33, the rest negative.       He does not feel his BPH symptoms need medical therapy as long as he avoids antihistamines.      Objective   Vital Signs:  Ht 185.4 cm (73\")   Wt 83 kg (183 lb)   BMI 24.14 kg/m²   Estimated body mass index is 24.14 kg/m² as " "calculated from the following:    Height as of this encounter: 185.4 cm (73\").    Weight as of this encounter: 83 kg (183 lb).    BMI is within normal parameters. No other follow-up for BMI required.      Physical Exam   Result Review :      Data reviewed: Radiologic studies CT sinuses 06/16/2022 and Consultant notes Abdulaziz Monet, pulmonology/Dr. Miles, ENT       Future Appointments   Date Time Provider Department Center   8/9/2022  2:00 PM Ruby Hough, MS CCC-A [facility] None   1/19/2023 11:30 AM Keegan Sainz MD [facility]        Assessment and Plan   Diagnoses and all orders for this visit:    1. Chronic maxillary sinusitis (Primary)    2. Chronic rhinitis  -     Ambulatory Referral to Allergy    3. Postnasal drip -chronic  -     Ambulatory Referral to Allergy    4. Seasonal allergic rhinitis due to pollen  -     Ambulatory Referral to Allergy    5. Mastoiditis of left side    6. Benign prostatic hyperplasia with weak urinary stream - h/o urinary retention with Claritin-D            Future Appointments   Date Time Provider Department Center   8/9/2022  2:00 PM Ruby Hough, MS CCC-A [facility] None   1/19/2023 11:30 AM Keegan Sainz MD [facility]     I spent 30 minutes caring for Corey on this date of service. This time includes time spent by me in the following activities:preparing for the visit, reviewing tests, obtaining and/or reviewing a separately obtained history, counseling and educating the patient/family/caregiver, ordering medications, tests, or procedures and documenting information in the medical record     Refer to Dr. Terry, allergist, in [place] for the chronic rhinitis/sinusitis.  Resume the Cipro and take as directed until current course is completed.  Continue the sinus irrigation using distilled or boiled water. Continue the Singulair 10 mg q.h.s. Recommended he increase the Flonase nasal spray to use one spray each nostril b.i.d.      Return 01/2023 " for routine follow up with fasting labs one week prior or sooner if needed.     Scribed for Dr. Sainz by Lorraine Patel [facility].     Follow Up   Return if symptoms worsen or fail to improve, for Next scheduled follow up.  Patient was given instructions and counseling regarding his condition or for health maintenance advice. Please see specific information pulled into the AVS if appropriate.

## 2022-07-26 ENCOUNTER — OFFICE VISIT (OUTPATIENT)
Dept: OTOLARYNGOLOGY | Facility: CLINIC | Age: 82
End: 2022-07-26

## 2022-07-26 ENCOUNTER — CLINICAL SUPPORT (OUTPATIENT)
Dept: AUDIOLOGY | Facility: CLINIC | Age: 82
End: 2022-07-26

## 2022-07-26 VITALS — WEIGHT: 182 LBS | OXYGEN SATURATION: 97 % | HEIGHT: 73 IN | BODY MASS INDEX: 24.12 KG/M2

## 2022-07-26 DIAGNOSIS — H69.02 PATULOUS EUSTACHIAN TUBE OF LEFT EAR: Primary | ICD-10-CM

## 2022-07-26 DIAGNOSIS — H93.13 TINNITUS OF BOTH EARS: ICD-10-CM

## 2022-07-26 DIAGNOSIS — H90.3 SENSORINEURAL HEARING LOSS (SNHL) OF BOTH EARS: Primary | ICD-10-CM

## 2022-07-26 DIAGNOSIS — J34.2 NASAL SEPTAL DEFORMITY: ICD-10-CM

## 2022-07-26 DIAGNOSIS — H90.3 SENSORINEURAL HEARING LOSS OF BOTH EARS: ICD-10-CM

## 2022-07-26 DIAGNOSIS — J31.0 CHRONIC RHINITIS: ICD-10-CM

## 2022-07-26 PROCEDURE — 92567 TYMPANOMETRY: CPT | Performed by: AUDIOLOGIST

## 2022-07-26 PROCEDURE — 99204 OFFICE O/P NEW MOD 45 MIN: CPT | Performed by: OTOLARYNGOLOGY

## 2022-07-26 PROCEDURE — 92557 COMPREHENSIVE HEARING TEST: CPT | Performed by: AUDIOLOGIST

## 2022-07-26 RX ORDER — AZELASTINE 1 MG/ML
2 SPRAY, METERED NASAL 2 TIMES DAILY
Qty: 30 ML | Refills: 11 | Status: SHIPPED | OUTPATIENT
Start: 2022-07-26 | End: 2022-10-25 | Stop reason: SDUPTHER

## 2022-07-26 RX ORDER — FEXOFENADINE HCL 180 MG/1
180 TABLET ORAL DAILY
COMMUNITY
Start: 2022-07-21 | End: 2023-07-22

## 2022-07-26 NOTE — PROGRESS NOTES
STANDARD AUDIOMETRIC EVALUATION      Name:  Corey Maki  :  1940  Age:  82 y.o.  Date of Evaluation:  2022      HISTORY    Reason for visit:  Corey Maki is seen today for a hearing test at the request of Dr. Elias Garcia.  Patient reports when he breathes, he hears air in his ear, and when he talks, he feels like he is talking in a drum.  He states it has been like this for the past couple months.  He states he sometimes hears ringing in his ears, and sometimes can be loud.       EVALUATION    See Audiogram    RESULTS        Otoscopy and Tympanometry 226 Hz :  Right Ear:  Otoscopy:  Testing completed after ears were examined by the ENT physician          Tympanometry:  Middle ear function within normal limits    Left Ear:   Otoscopy:  Testing completed after ears were examined by the ENT physician        Tympanometry:  Shallow eardrum mobility    Test technique:  Standard Audiometry     Pure Tone Audiometry:   Patient responded to pure tones at 10-80 dB for 250-8000 Hz in right ear, and at 25-85 dB for 250-8000 Hz in left ear.       Speech Audiometry:        Right Ear:  Speech Reception Threshold (SRT) was obtained at 15 dBHL                 Speech Discrimination scores were 68% in quiet when words were presented at 55 dBHL       Left Ear:  Speech Reception Threshold (SRT) was obtained at 25 dBHL                 Speech Discrimination scores were 80% in quiet when words were presented at 65 dBHL    Reliability:   good    IMPRESSIONS:  1.  Tympanometry results are consistent with Middle ear function within normal limits in right ear, and Shallow eardrum mobility in left ear.  2.  Pure tone results are consistent with within normal limits to severe precipitous, high frequency sensorineural hearing loss for right ear, and mild to severe precipitous sensorineural hearing loss  in left ear.       RECOMMENDATIONS:  Patient is seeing the Ear Nose and Throat physician immediately following this  examination.  It was a pleasure seeing Corey Maki in Audiology today.  We would be happy to do further testing or discuss these test as necessary.          This document has been electronically signed by Ruby Hough MS CCC-A on July 26, 2022 16:44 CDT       Ruby Hough MS CCC-A  Licensed Audiologist

## 2022-07-26 NOTE — PROGRESS NOTES
Subjective   Corey Maki is a 82 y.o. male.       History of Present Illness     Patient had previously seen .  Primary complaint is that he hears himself breathing in his left ear.  He says this feels blocked.  He has not had any significant weight loss recently.  He uses Flonase and Singulair and despite this has daily nasal congestion particularly in the morning.  Uses saline lavage.  Underwent a CT scan of the sinuses.  This is available and personally reviewed and shows just a small amount of dependent fluid in the right maxillary sinus that I suspect is from his saline irrigation and not infection, otherwise the sinuses are clear.  Also complains of the roof of his mouth being dry when he wakes up in the morning    The following portions of the patient's history were reviewed and updated as appropriate: allergies, current medications, past family history, past medical history, past social history, past surgical history and problem list.     reports that he quit smoking about 62 years ago. He has a 5.00 pack-year smoking history. He has never used smokeless tobacco. He reports that he does not drink alcohol and does not use drugs.   Patient is not a tobacco user and has not been counseled for use of tobacco products      Review of Systems        Objective   Physical Exam  Ears: External ears no deformity, canals no discharge, tympanic membranes intact clear and mobile bilaterally.  Nares: Boggy mucosa septum to the left no discharge or purulence  Oral cavity: No masses or lesions  Pharynx: No erythema exudate or mass  Neck: No adenopathy or mass  Audiogram is obtained and reviewed and shows a bilateral steeply sloping high-frequency sensorineural hearing loss with type a tympanograms.  Discrimination 68% on the right 80% on the left         Assessment and Plan   Diagnoses and all orders for this visit:    1. Patulous eustachian tube of left ear (Primary)    2. Sensorineural hearing loss of both  ears    3. Chronic rhinitis    4. Nasal septal deformity    Other orders  -     azelastine (ASTELIN) 0.1 % nasal spray; 2 sprays into the nostril(s) as directed by provider 2 (Two) Times a Day. Use in each nostril as directed  Dispense: 30 mL; Refill: 11             Plan: Symptoms are most consistent with patulous eustachian tube dysfunction on the left.  I explained to the patient that unfortunately there may not be much to do about this but I do think adding Astelin 2 sprays each nostril twice a day for his rhinitis would be worth trying.  Told him to consider using a coolmist vaporizer by his bed at night to help with the dry mouth.  Return 3 months.  Call for problems.

## 2022-07-29 ENCOUNTER — LAB (OUTPATIENT)
Dept: LAB | Facility: OTHER | Age: 82
End: 2022-07-29

## 2022-07-29 ENCOUNTER — OFFICE VISIT (OUTPATIENT)
Dept: FAMILY MEDICINE CLINIC | Facility: CLINIC | Age: 82
End: 2022-07-29

## 2022-07-29 DIAGNOSIS — R35.0 URINARY FREQUENCY: Primary | ICD-10-CM

## 2022-07-29 DIAGNOSIS — G89.29 CHRONIC LEFT-SIDED LOW BACK PAIN WITH LEFT-SIDED SCIATICA: Chronic | ICD-10-CM

## 2022-07-29 DIAGNOSIS — N40.1 BENIGN PROSTATIC HYPERPLASIA WITH URINARY FREQUENCY: ICD-10-CM

## 2022-07-29 DIAGNOSIS — K21.9 GASTROESOPHAGEAL REFLUX DISEASE WITHOUT ESOPHAGITIS: Chronic | ICD-10-CM

## 2022-07-29 DIAGNOSIS — R35.0 BENIGN PROSTATIC HYPERPLASIA WITH URINARY FREQUENCY: ICD-10-CM

## 2022-07-29 DIAGNOSIS — M54.42 CHRONIC LEFT-SIDED LOW BACK PAIN WITH LEFT-SIDED SCIATICA: Chronic | ICD-10-CM

## 2022-07-29 DIAGNOSIS — R30.0 DYSURIA: Primary | ICD-10-CM

## 2022-07-29 DIAGNOSIS — I10 ESSENTIAL HYPERTENSION: Chronic | ICD-10-CM

## 2022-07-29 LAB
BILIRUB UR QL STRIP: NEGATIVE
CLARITY UR: CLEAR
COLOR UR: YELLOW
GLUCOSE UR STRIP-MCNC: NEGATIVE MG/DL
HGB UR QL STRIP.AUTO: NEGATIVE
KETONES UR QL STRIP: NEGATIVE
LEUKOCYTE ESTERASE UR QL STRIP.AUTO: NEGATIVE
NITRITE UR QL STRIP: NEGATIVE
PH UR STRIP.AUTO: 5.5 [PH] (ref 5.5–8)
PROT UR QL STRIP: NEGATIVE
SP GR UR STRIP: 1.01 (ref 1–1.03)
UROBILINOGEN UR QL STRIP: NORMAL

## 2022-07-29 PROCEDURE — 81003 URINALYSIS AUTO W/O SCOPE: CPT | Performed by: INTERNAL MEDICINE

## 2022-07-29 PROCEDURE — 99214 OFFICE O/P EST MOD 30 MIN: CPT | Performed by: INTERNAL MEDICINE

## 2022-07-29 RX ORDER — TAMSULOSIN HYDROCHLORIDE 0.4 MG/1
1 CAPSULE ORAL DAILY
Qty: 30 CAPSULE | Refills: 1 | Status: SHIPPED | OUTPATIENT
Start: 2022-07-29 | End: 2023-01-19

## 2022-07-29 NOTE — PROGRESS NOTES
Chief Complaint  Urinary Frequency (When he wakes up in the mornings has to urinate and does this frequesntly x 2-3 weeks. States he has noted an odor and also having left side back pain with this ) and Medication Problem (States Dr. Monet told him that he should take the Losartan once daily and that is what he has been doing )    Subjective        Corey Maki presents to [facility]  History of Present Illness    Corey has multiple issues to discuss today.    For the past 2 or 3 weeks, he has noted increased urinary frequency/urgency in the morning hours.  He denies nocturia.  He denies weak stream or significant dysuria.  He thought his urine had a stronger odor for a while.  Urinalysis today is completely normal.  No fevers or chills or hematuria.    Dr. Monet's after visit summary from his last pulmonology visit had his losartan listed as once daily, so he has only been taking it once daily.  His pulmonologist did not suggest he reduce the losartan, he just saw that dosage on his after visit summary and has been only taking it once daily since that time.  He is taking 25 mg each morning and reports blood pressure has been consistently at goal with that dose.    He continues to have mild to moderate low back pain in the lumbar region on the left with some radicular symptoms on the left episodically.  He was asking about taking very high doses of ibuprofen, which I recommended against due to his GERD history and age and dosage that he was thinking about.  He has been taking 1000 mg of Tylenol in the morning when his back is bothering him and reports that it helps quite a bit.  By noon, he is feeling pretty good for the rest of the day usually.  We discussed the safe amounts of Tylenol that he can take, then possibly occasionally adding an OTC naproxen sodium if needed.    He has a history of vertebral compression fracture.  His most recent DEXA continues to  "reveal osteoporosis.  He declines osteoporosis medication.  He is wondering if the vertebral compression fractures have changed in the alignment somewhat in his spine and perhaps affected leg length, as he notices that he wears the sole on one of his walking shoes much more than the other, which had not been the case through the years.  I told him that was a definite possibility.  He was wondering about a doctor that could check that out.  I recommended Dr. Brian, physiologist in [place].  He declines a referral today.  He will let me know if he changes his mind.    Objective   Vital Signs:  /88   Pulse 56   Temp 96 °F (35.6 °C) (Tympanic)   Ht 185.4 cm (73\")   Wt 82.4 kg (181 lb 9.6 oz)   BMI 23.96 kg/m²   Estimated body mass index is 23.96 kg/m² as calculated from the following:    Height as of this encounter: 185.4 cm (73\").    Weight as of this encounter: 82.4 kg (181 lb 9.6 oz).    BMI is within normal parameters. No other follow-up for BMI required.      Physical Exam   Result Review :    Common labs    Common Labsle 1/6/22 1/6/22 1/6/22 1/6/22 2/14/22 3/7/22    0812 0812 0812 0812     Glucose   106 (A)   129 (A)   Glucose     101    BUN   26 (A)  18 21   Creatinine   1.11  1.1 1.10   eGFR Non African Am   64      Sodium   140  138 138   Potassium   4.0  4.3 4.3   Chloride   105  104 102   Calcium   9.9  9.7 9.5   Albumin   4.30  4.2    Total Bilirubin   0.6  0.65    Alkaline Phosphatase   90  78    AST (SGOT)   24  15    ALT (SGPT)   14  7    WBC 6.97        Hemoglobin 14.1        Hematocrit 42.5        Platelets 146        Total Cholesterol  188       Triglycerides  89       HDL Cholesterol  50       LDL Cholesterol   122 (A)       PSA    0.524     (A) Abnormal value          Urinalysis normal today  Data reviewed: Radiologic studies DEXA and Consultant notes Dr Monet          Assessment and Plan   Diagnoses and all orders for this visit:    1. Urinary frequency (Primary)    2. Benign " prostatic hyperplasia with urinary frequency    3. Essential hypertension    4. Chronic left-sided low back pain with left-sided sciatica    5. Gastroesophageal reflux disease without esophagitis    Other orders  -     tamsulosin (FLOMAX) 0.4 MG capsule 24 hr capsule; Take 1 capsule by mouth Daily. For prostate as needed  Dispense: 30 capsule; Refill: 1    I have given him a prescription for Flomax to use as needed whenever he is having issues with urinary frequency.    I have not changed to the prescription on losartan today.  The prescription is still for 25 mg twice daily, but if he can maintain blood pressure at goal with regular exercise and losartan 25 mg once daily, that is fine.  Sodium restriction.  Continue to monitor blood pressure at home.  Take the additional tablet if not at goal with once daily dosing.    Continue Protonix for GERD.  I urged him to avoid frequent or chronic NSAID use due to this issue and his age.  I recommended Tylenol as his primary OTC pain reliever for the low back pain and occasional radicular symptoms, which seems to work pretty well.  I suggested he might add an OTC naproxen sodium once daily as needed when Tylenol is not adequate.    With the lumbar radiculopathy issues and his concerns about leg length and impact of vertebral compression fractures on his gait, I recommended/offered referral to Dr. Brian, physical medicine, and [facility].  He declines that referral today, but will let me know when/if ready for referral.    Return to clinic as previously scheduled with fasting labs prior.  Sooner if needed.       I spent 33 minutes caring for Corey on this date of service. This time includes time spent by me in the following activities:preparing for the visit, reviewing tests, counseling and educating the patient/family/caregiver, ordering medications, tests, or procedures and documenting information in the medical record  Follow Up   No follow-ups on file.  Patient was given  instructions and counseling regarding his condition or for health maintenance advice. Please see specific information pulled into the AVS if appropriate.

## 2022-07-31 VITALS
DIASTOLIC BLOOD PRESSURE: 88 MMHG | HEART RATE: 56 BPM | HEIGHT: 73 IN | WEIGHT: 181.6 LBS | TEMPERATURE: 96 F | BODY MASS INDEX: 24.07 KG/M2 | SYSTOLIC BLOOD PRESSURE: 138 MMHG

## 2022-10-25 ENCOUNTER — OFFICE VISIT (OUTPATIENT)
Dept: OTOLARYNGOLOGY | Facility: CLINIC | Age: 82
End: 2022-10-25

## 2022-10-25 VITALS — BODY MASS INDEX: 24.2 KG/M2 | WEIGHT: 182.6 LBS | TEMPERATURE: 97.6 F | HEIGHT: 73 IN

## 2022-10-25 DIAGNOSIS — H69.02 PATULOUS EUSTACHIAN TUBE OF LEFT EAR: Primary | ICD-10-CM

## 2022-10-25 DIAGNOSIS — J31.0 CHRONIC RHINITIS: ICD-10-CM

## 2022-10-25 PROCEDURE — 99213 OFFICE O/P EST LOW 20 MIN: CPT | Performed by: OTOLARYNGOLOGY

## 2022-10-25 RX ORDER — AZELASTINE 1 MG/ML
2 SPRAY, METERED NASAL 2 TIMES DAILY
Qty: 30 ML | Refills: 11 | Status: SHIPPED | OUTPATIENT
Start: 2022-10-25

## 2022-10-26 NOTE — PROGRESS NOTES
Subjective   Corey Maki is a 82 y.o. male.       History of Present Illness     Patient is followed with patulous eustachian tube dysfunction on the left as well as chronic rhinitis.  States the Astelin has helped his rhinitis but he still has the eustachian tube symptoms.  Also hears popping and cracking sometimes when he will swallow.  Says his symptoms are better when he is supine.  No otorrhea.    The following portions of the patient's history were reviewed and updated as appropriate: allergies, current medications, past family history, past medical history, past social history, past surgical history and problem list.     reports that he quit smoking about 62 years ago. His smoking use included cigarettes. He has a 5.00 pack-year smoking history. He has never used smokeless tobacco. He reports that he does not drink alcohol and does not use drugs.   Patient is not a tobacco user and has not been counseled for use of tobacco products      Review of Systems        Objective   Physical Exam  Ears: External ears no deformity, canals no discharge, tympanic membranes intact clear and mobile bilaterally.  Nares boggy mucosa no discharge or purulence  Oral cavity: No masses or lesions  Neck: No adenopathy or mass         Assessment and Plan   Diagnoses and all orders for this visit:    1. Patulous eustachian tube of left ear (Primary)    2. Chronic rhinitis    Other orders  -     azelastine (ASTELIN) 0.1 % nasal spray; 2 sprays into the nostril(s) as directed by provider 2 (Two) Times a Day. Use in each nostril as directed  Dispense: 30 mL; Refill: 11             Plan: I explained to the patient that his eustachian tube symptoms unfortunately would likely persist and that I would just recommend continuing to treat his rhinitis.  I have sent a refill of his Astelin and will have him return in 6 months.  Briefly discussed his hearing.  He previously had a hearing test that showed a bilateral steeply sloping  high-frequency loss.  He does not feel like he is ready to consider a hearing aid and so there is nothing more to be done for that at this point.     yes

## 2023-01-03 RX ORDER — AMLODIPINE BESYLATE 10 MG/1
10 TABLET ORAL NIGHTLY
Qty: 90 TABLET | Refills: 3 | Status: SHIPPED | OUTPATIENT
Start: 2023-01-03

## 2023-01-12 ENCOUNTER — LAB (OUTPATIENT)
Dept: LAB | Facility: OTHER | Age: 83
End: 2023-01-12
Payer: COMMERCIAL

## 2023-01-12 DIAGNOSIS — M81.0 AGE-RELATED OSTEOPOROSIS WITHOUT CURRENT PATHOLOGICAL FRACTURE: Chronic | ICD-10-CM

## 2023-01-12 DIAGNOSIS — E78.2 MIXED HYPERLIPIDEMIA: Chronic | ICD-10-CM

## 2023-01-12 DIAGNOSIS — I10 ESSENTIAL HYPERTENSION: Chronic | ICD-10-CM

## 2023-01-12 DIAGNOSIS — Z12.5 SCREENING FOR PROSTATE CANCER: ICD-10-CM

## 2023-01-12 DIAGNOSIS — R73.01 IFG (IMPAIRED FASTING GLUCOSE): Chronic | ICD-10-CM

## 2023-01-12 LAB
25(OH)D3 SERPL-MCNC: 83.8 NG/ML (ref 30–100)
ALBUMIN SERPL-MCNC: 4.2 G/DL (ref 3.5–5)
ALBUMIN/GLOB SERPL: 1.3 G/DL (ref 1.1–1.8)
ALP SERPL-CCNC: 102 U/L (ref 38–126)
ALT SERPL W P-5'-P-CCNC: 16 U/L
ANION GAP SERPL CALCULATED.3IONS-SCNC: 10 MMOL/L (ref 5–15)
AST SERPL-CCNC: 21 U/L (ref 17–59)
BASOPHILS # BLD AUTO: 0.01 10*3/MM3 (ref 0–0.2)
BASOPHILS NFR BLD AUTO: 0.1 % (ref 0–1.5)
BILIRUB SERPL-MCNC: 0.8 MG/DL (ref 0.2–1.3)
BUN SERPL-MCNC: 18 MG/DL (ref 7–23)
BUN/CREAT SERPL: 16.2 (ref 7–25)
CALCIUM SPEC-SCNC: 9.7 MG/DL (ref 8.4–10.2)
CHLORIDE SERPL-SCNC: 103 MMOL/L (ref 101–112)
CHOLEST SERPL-MCNC: 191 MG/DL (ref 150–200)
CO2 SERPL-SCNC: 28 MMOL/L (ref 22–30)
CREAT SERPL-MCNC: 1.11 MG/DL (ref 0.7–1.3)
DEPRECATED RDW RBC AUTO: 43.6 FL (ref 37–54)
EGFRCR SERPLBLD CKD-EPI 2021: 66.3 ML/MIN/1.73
EOSINOPHIL # BLD AUTO: 0.08 10*3/MM3 (ref 0–0.4)
EOSINOPHIL NFR BLD AUTO: 1.1 % (ref 0.3–6.2)
ERYTHROCYTE [DISTWIDTH] IN BLOOD BY AUTOMATED COUNT: 13.4 % (ref 12.3–15.4)
GLOBULIN UR ELPH-MCNC: 3.3 GM/DL (ref 2.3–3.5)
GLUCOSE SERPL-MCNC: 100 MG/DL (ref 70–99)
HBA1C MFR BLD: 5.6 % (ref 4.8–5.6)
HCT VFR BLD AUTO: 43.7 % (ref 37.5–51)
HDLC SERPL-MCNC: 54 MG/DL (ref 40–59)
HGB BLD-MCNC: 14.7 G/DL (ref 13–17.7)
LDLC SERPL CALC-MCNC: 122 MG/DL
LDLC/HDLC SERPL: 2.24 {RATIO} (ref 0–3.55)
LYMPHOCYTES # BLD AUTO: 2.1 10*3/MM3 (ref 0.7–3.1)
LYMPHOCYTES NFR BLD AUTO: 29.6 % (ref 19.6–45.3)
MCH RBC QN AUTO: 30.1 PG (ref 26.6–33)
MCHC RBC AUTO-ENTMCNC: 33.6 G/DL (ref 31.5–35.7)
MCV RBC AUTO: 89.4 FL (ref 79–97)
MONOCYTES # BLD AUTO: 0.51 10*3/MM3 (ref 0.1–0.9)
MONOCYTES NFR BLD AUTO: 7.2 % (ref 5–12)
NEUTROPHILS NFR BLD AUTO: 4.39 10*3/MM3 (ref 1.7–7)
NEUTROPHILS NFR BLD AUTO: 62 % (ref 42.7–76)
PLATELET # BLD AUTO: 147 10*3/MM3 (ref 140–450)
PMV BLD AUTO: 12.1 FL (ref 6–12)
POTASSIUM SERPL-SCNC: 3.9 MMOL/L (ref 3.4–5)
PROT SERPL-MCNC: 7.5 G/DL (ref 6.3–8.6)
PSA SERPL-MCNC: 0.36 NG/ML (ref 0–4)
RBC # BLD AUTO: 4.89 10*6/MM3 (ref 4.14–5.8)
SODIUM SERPL-SCNC: 141 MMOL/L (ref 137–145)
TRIGL SERPL-MCNC: 80 MG/DL
TSH SERPL DL<=0.05 MIU/L-ACNC: 1.86 UIU/ML (ref 0.27–4.2)
VLDLC SERPL-MCNC: 15 MG/DL (ref 5–40)
WBC NRBC COR # BLD: 7.09 10*3/MM3 (ref 3.4–10.8)

## 2023-01-12 PROCEDURE — 83036 HEMOGLOBIN GLYCOSYLATED A1C: CPT | Performed by: INTERNAL MEDICINE

## 2023-01-12 PROCEDURE — 36415 COLL VENOUS BLD VENIPUNCTURE: CPT | Performed by: INTERNAL MEDICINE

## 2023-01-12 PROCEDURE — 80053 COMPREHEN METABOLIC PANEL: CPT | Performed by: INTERNAL MEDICINE

## 2023-01-12 PROCEDURE — 80061 LIPID PANEL: CPT | Performed by: INTERNAL MEDICINE

## 2023-01-12 PROCEDURE — 82306 VITAMIN D 25 HYDROXY: CPT | Performed by: INTERNAL MEDICINE

## 2023-01-12 PROCEDURE — 84443 ASSAY THYROID STIM HORMONE: CPT | Performed by: INTERNAL MEDICINE

## 2023-01-12 PROCEDURE — G0103 PSA SCREENING: HCPCS | Performed by: INTERNAL MEDICINE

## 2023-01-12 PROCEDURE — 85025 COMPLETE CBC W/AUTO DIFF WBC: CPT | Performed by: INTERNAL MEDICINE

## 2023-01-17 NOTE — PROGRESS NOTES
Chief Complaint  Essential hypertension, Slow Heart Rate, IFG (impaired fasting glucose), Benign prostatic hyperplasia with urinary frequency -  h/o , Osteoporosis, and covid booster (2nd covid booster)    Subjective        History of Present Illness     Corey Maki presents to the office for annual follow up of hypertension, GERD, hyperlipidemia, and osteoporosis among other issues.  He is a very nice gentleman, but he is a minimalist in regards to traditional medication therapy.  He continues to follow a healthy diet and walks 3 miles daily.  He has exercised regularly for all of his adult life.  He is quite fit for his age.    His blood pressure control is reasonable today with Norvasc.  We had prescribed some very low-dose losartan, but he experienced back pain and felt it was due to the blood pressure medication, which is unlikely.  He seems to be reasonably managing hypertension with Norvasc and regular exercise and sodium restriction.    He has a history of vertebral compression fracture.  His most recent DEXA 01/2022 continues to reveal osteoporosis of the hip.  He has declined prescription medication to treat osteoporosis.  We had a lengthy discussion on this topic again today.  I educated regarding him being at high risk for additional vertebral compression fractures as well as hip fractures and other fractures.  We reviewed fall precautions.  He continues to decline prescription medical treatment for the osteoporosis.    He continues to struggle with lots of chronic postnasal drip and persistent eustachian tube dysfunction.  He reports that he is taking the Singulair, but stopped taking the Flonase nasal spray that I prescribed and stopped taking the Astelin nasal spray that Dr. Garcia prescribed.  He is using saline nasal spray approximately once daily, and we discussed using it more often.  He has used a humidifier in the bedroom during winter heating season in the past, but has not started this year.   "He will have follow-up appointment with Dr. Garcia in a few months.    Last summer, I prescribed Flomax to use for BPH/urinary frequency.  He is not using it, reporting only minimal BPH symptoms currently.    He agrees to the bivalent COVID-19 booster and received that today without difficulty.    Lab results are reviewed with the patient today.  Normal renal and liver function.  Labs continue to reveal IFG.  Normal thyroid screen.  LDL remains above goal at 122, although, HDL of 54 gives him a favorable cholesterol ratio 2.2.    Objective   Vital Signs:  /80 (BP Location: Left arm, Patient Position: Sitting, Cuff Size: Large Adult)   Pulse 53   Temp 97 °F (36.1 °C) (Tympanic)   Ht 185.4 cm (72.99\")   Wt 82.1 kg (181 lb)   SpO2 98%   BMI 23.89 kg/m²   Estimated body mass index is 23.89 kg/m² as calculated from the following:    Height as of this encounter: 185.4 cm (72.99\").    Weight as of this encounter: 82.1 kg (181 lb).       BMI is within normal parameters. No other follow-up for BMI required.      Physical Exam  Vitals reviewed.   Constitutional:       General: He is not in acute distress.     Appearance: He is well-developed.   HENT:      Head: Normocephalic and atraumatic.      Nose:      Right Sinus: No maxillary sinus tenderness or frontal sinus tenderness.      Left Sinus: No maxillary sinus tenderness or frontal sinus tenderness.      Mouth/Throat:      Mouth: No oral lesions.      Pharynx: Uvula midline.      Tonsils: No tonsillar exudate.   Eyes:      Conjunctiva/sclera: Conjunctivae normal.      Pupils: Pupils are equal, round, and reactive to light.   Neck:      Thyroid: No thyroid mass or thyromegaly.      Vascular: No carotid bruit or JVD.      Trachea: Trachea normal. No tracheal deviation.   Cardiovascular:      Rate and Rhythm: Normal rate and regular rhythm.  No extrasystoles are present.     Chest Wall: PMI is not displaced.      Heart sounds: Normal heart sounds. No murmur " heard.  Pulmonary:      Effort: Pulmonary effort is normal. No accessory muscle usage or respiratory distress.      Breath sounds: Normal breath sounds. No decreased breath sounds, wheezing, rhonchi or rales.   Abdominal:      General: Bowel sounds are normal. There is no distension.      Palpations: Abdomen is soft.      Tenderness: There is no abdominal tenderness.   Musculoskeletal:      Cervical back: Neck supple.   Lymphadenopathy:      Cervical: No cervical adenopathy.   Skin:     General: Skin is warm and dry.      Findings: No rash.      Nails: There is no clubbing.   Neurological:      Mental Status: He is alert and oriented to person, place, and time.      Cranial Nerves: No cranial nerve deficit.      Coordination: Coordination normal.   Psychiatric:         Speech: Speech normal.         Behavior: Behavior normal.         Thought Content: Thought content normal.         Judgment: Judgment normal.            Result Review :    CMP    CMP 2/14/22 3/7/22 1/12/23   Glucose  129 (A) 100 (A)   Glucose 101     BUN 18 21 18   Creatinine 1.1 1.10 1.11   eGFR  67.4 66.3   Sodium 138 138 141   Potassium 4.3 4.3 3.9   Chloride 104 102 103   Calcium 9.7 9.5 9.7   Total Protein   7.5   Albumin 4.2  4.2   Globulin   3.3   Total Bilirubin 0.65  0.8   Alkaline Phosphatase 78  102   AST (SGOT) 15  21   ALT (SGPT) 7  16   Albumin/Globulin Ratio   1.3   BUN/Creatinine Ratio  19.1 16.2   Anion Gap  7.0 10.0   (A) Abnormal value       Comments are available for some flowsheets but are not being displayed.           CBC w/diff    CBC w/Diff 1/12/23   WBC 7.09   RBC 4.89   Hemoglobin 14.7   Hematocrit 43.7   MCV 89.4   MCH 30.1   MCHC 33.6   RDW 13.4   Platelets 147   Neutrophil Rel % 62.0   Lymphocyte Rel % 29.6   Monocyte Rel % 7.2   Eosinophil Rel % 1.1   Basophil Rel % 0.1           Lipid Panel    Lipid Panel 1/12/23   Total Cholesterol 191   Triglycerides 80   HDL Cholesterol 54   VLDL Cholesterol 15   LDL Cholesterol  122  (A)   LDL/HDL Ratio 2.24   (A) Abnormal value            TSH    TSH 1/12/23   TSH 1.860           A1C Last 3 Results    HGBA1C Last 3 Results 1/12/23   Hemoglobin A1C 5.60           PSA    PSA 1/12/23   PSA 0.361           Data reviewed: Consultant notes Dr. Cody Garcia, ENT             Assessment and Plan   Diagnoses and all orders for this visit:    1. Essential hypertension (Primary)  -     CBC Auto Differential; Future  -     Comprehensive Metabolic Panel; Future  -     Lipid Panel; Future  -     TSH; Future    2. Sinus bradycardia    3. Postnasal drip -chronic    4. Chronic rhinitis    5. Age-related osteoporosis without current pathological fracture  -     Vitamin D,25-Hydroxy; Future    6. Seasonal allergic rhinitis due to pollen    7. Need for COVID-19 vaccine    8. IFG (impaired fasting glucose)  -     Comprehensive Metabolic Panel; Future  -     Hemoglobin A1c; Future  -     TSH; Future    9. Dysfunction of left eustachian tube    10. Screening for prostate cancer  -     PSA Screen; Future    11. Mixed hyperlipidemia  -     Lipid Panel; Future  -     TSH; Future    12. Vitamin D deficiency  -     Vitamin D,25-Hydroxy; Future    Other orders  -     COVID-19 Bivalent Booster (Pfizer) 12+yrs    Continue Norvasc for hypertension which is adequately controlled.  Continue sodium restriction and regular exercise.  He is body weight is at goal.    His sinus bradycardia is indicative of his excellent conditioning due to regular exercise.  No intervention needed.  Stable.    We reviewed traditional management of eustachian tube dysfunction, chronic rhinitis, chronic postnasal drip with nasal steroids, Astelin nasal spray if helpful, frequent use of OTC saline nasal spray and/or episodic sinus irrigation, using a humidifier in the bedroom during winter heating season.  He is using saline nasal spray once daily, but none of the other interventions currently.  I reviewed Dr. Garcia's notes.  He will continue to follow  with Dr. Garcia.  He will need to be cautious regarding use of antihistamines/decongestants during spring allergy season, as it seemed to cause some urinary retention/worsening BPH issues last year.    We will continue to monitor his osteoporosis every 2 years, if he is willing.  He understands the the risks of untreated osteoporosis, but continues to decline prescription medication for this issue.  Continue the good dietary calcium and supplemental vitamin D 5000 units, which I recommended he reduce to QOD dosing    His hyperlipidemia is adequately managed with healthy diet and regular exercise.    Continue a low carbohydrate diet and regular exercise to delay progression of IFG, which is stable.    He receives bivalent COVID-19 booster today without difficulty or complication.    Return to clinic for annual checkup with fasting labs prior.  Return for other issues as needed.       I spent 43 minutes caring for Corey on this date of service. This time includes time spent by me in the following activities:preparing for the visit, reviewing tests, obtaining and/or reviewing a separately obtained history, performing a medically appropriate examination and/or evaluation , counseling and educating the patient/family/caregiver, ordering medications, tests, or procedures and documenting information in the medical record     Follow Up   Return in about 1 year (around 1/19/2024) for Next scheduled follow up - labs 1 week prior, Annual physical.  Patient was given instructions and counseling regarding his condition or for health maintenance advice. Please see specific information pulled into the AVS if appropriate.        Statement Selected

## 2023-01-19 ENCOUNTER — OFFICE VISIT (OUTPATIENT)
Dept: FAMILY MEDICINE CLINIC | Facility: CLINIC | Age: 83
End: 2023-01-19
Payer: MEDICARE

## 2023-01-19 VITALS
TEMPERATURE: 97 F | DIASTOLIC BLOOD PRESSURE: 80 MMHG | BODY MASS INDEX: 23.99 KG/M2 | HEIGHT: 73 IN | OXYGEN SATURATION: 98 % | SYSTOLIC BLOOD PRESSURE: 136 MMHG | HEART RATE: 53 BPM | WEIGHT: 181 LBS

## 2023-01-19 DIAGNOSIS — E55.9 VITAMIN D DEFICIENCY: ICD-10-CM

## 2023-01-19 DIAGNOSIS — Z12.5 SCREENING FOR PROSTATE CANCER: ICD-10-CM

## 2023-01-19 DIAGNOSIS — J31.0 CHRONIC RHINITIS: Chronic | ICD-10-CM

## 2023-01-19 DIAGNOSIS — R00.1 SINUS BRADYCARDIA: Chronic | ICD-10-CM

## 2023-01-19 DIAGNOSIS — H69.82 DYSFUNCTION OF LEFT EUSTACHIAN TUBE: Chronic | ICD-10-CM

## 2023-01-19 DIAGNOSIS — Z23 NEED FOR COVID-19 VACCINE: ICD-10-CM

## 2023-01-19 DIAGNOSIS — R73.01 IFG (IMPAIRED FASTING GLUCOSE): Chronic | ICD-10-CM

## 2023-01-19 DIAGNOSIS — I10 ESSENTIAL HYPERTENSION: Primary | Chronic | ICD-10-CM

## 2023-01-19 DIAGNOSIS — R09.82 POSTNASAL DRIP: Chronic | ICD-10-CM

## 2023-01-19 DIAGNOSIS — E78.2 MIXED HYPERLIPIDEMIA: ICD-10-CM

## 2023-01-19 DIAGNOSIS — M81.0 AGE-RELATED OSTEOPOROSIS WITHOUT CURRENT PATHOLOGICAL FRACTURE: Chronic | ICD-10-CM

## 2023-01-19 DIAGNOSIS — J30.1 SEASONAL ALLERGIC RHINITIS DUE TO POLLEN: Chronic | ICD-10-CM

## 2023-01-19 PROCEDURE — 91312 COVID-19 (PFIZER) BIVALENT BOOSTER 12+YRS: CPT | Performed by: INTERNAL MEDICINE

## 2023-01-19 PROCEDURE — 99215 OFFICE O/P EST HI 40 MIN: CPT | Performed by: INTERNAL MEDICINE

## 2023-01-19 PROCEDURE — 0124A COVID-19 (PFIZER) BIVALENT BOOSTER 12+YRS: CPT | Performed by: INTERNAL MEDICINE

## 2023-02-20 ENCOUNTER — TELEPHONE (OUTPATIENT)
Dept: FAMILY MEDICINE CLINIC | Facility: CLINIC | Age: 83
End: 2023-02-20
Payer: COMMERCIAL

## 2023-02-20 RX ORDER — LOSARTAN POTASSIUM 25 MG/1
25 TABLET ORAL DAILY
Qty: 90 TABLET | Refills: 3 | Status: SHIPPED | OUTPATIENT
Start: 2023-02-20

## 2023-02-20 NOTE — TELEPHONE ENCOUNTER
Patient called and requested refill of Losartan. His visit in January state it was discontinued d/t side effects. He states he doesn't remember saying that and he is still taking 1 tab daily. Refill sent TP

## 2023-03-03 ENCOUNTER — TELEPHONE (OUTPATIENT)
Dept: FAMILY MEDICINE CLINIC | Facility: CLINIC | Age: 83
End: 2023-03-03
Payer: COMMERCIAL

## 2023-03-03 NOTE — TELEPHONE ENCOUNTER
I spoke to patient and he informed me that his cardiologist gave him Crestor 10 mg to start taking and he wanted Dr. Sainz opinion on this med. I informed patient that Dr. Sainz utilizes that medication frequently and he would agree with what the cardiologist recommended. Patient states that he will start taking it. TP

## 2023-04-18 ENCOUNTER — OFFICE VISIT (OUTPATIENT)
Dept: FAMILY MEDICINE CLINIC | Facility: CLINIC | Age: 83
End: 2023-04-18
Payer: MEDICARE

## 2023-04-18 VITALS
BODY MASS INDEX: 23.75 KG/M2 | DIASTOLIC BLOOD PRESSURE: 98 MMHG | HEIGHT: 73 IN | HEART RATE: 57 BPM | OXYGEN SATURATION: 99 % | WEIGHT: 179.2 LBS | TEMPERATURE: 97.9 F | SYSTOLIC BLOOD PRESSURE: 158 MMHG

## 2023-04-18 DIAGNOSIS — Z23 NEED FOR PNEUMOCOCCAL VACCINE: ICD-10-CM

## 2023-04-18 DIAGNOSIS — M25.552 BILATERAL HIP PAIN: ICD-10-CM

## 2023-04-18 DIAGNOSIS — Z00.00 MEDICARE ANNUAL WELLNESS VISIT, SUBSEQUENT: Primary | ICD-10-CM

## 2023-04-18 DIAGNOSIS — I10 ESSENTIAL HYPERTENSION: Chronic | ICD-10-CM

## 2023-04-18 DIAGNOSIS — J30.2 SEASONAL ALLERGIC RHINITIS, UNSPECIFIED TRIGGER: ICD-10-CM

## 2023-04-18 DIAGNOSIS — M25.551 BILATERAL HIP PAIN: ICD-10-CM

## 2023-04-18 DIAGNOSIS — M54.16 LEFT LUMBAR RADICULOPATHY: ICD-10-CM

## 2023-04-18 DIAGNOSIS — J31.0 CHRONIC RHINITIS: ICD-10-CM

## 2023-04-18 DIAGNOSIS — R09.82 POSTNASAL DRIP: ICD-10-CM

## 2023-04-18 RX ORDER — FLUTICASONE PROPIONATE 50 MCG
1 SPRAY, SUSPENSION (ML) NASAL 2 TIMES DAILY PRN
Qty: 15.8 ML | Refills: 6 | Status: SHIPPED | OUTPATIENT
Start: 2023-04-18

## 2023-04-18 RX ORDER — ROSUVASTATIN CALCIUM 10 MG/1
1 TABLET, COATED ORAL DAILY
COMMUNITY
Start: 2023-03-01

## 2023-04-18 RX ORDER — SENNOSIDES 8.6 MG
1300 CAPSULE ORAL 2 TIMES DAILY
COMMUNITY

## 2023-04-18 NOTE — PATIENT INSTRUCTIONS
Medicare Wellness  Personal Prevention Plan of Service     Date of Office Visit:    Encounter Provider:  Keegan Sainz MD  Place of Service:  [facility]  Patient Name: Corey Maki  :  1940    As part of the Medicare Wellness portion of your visit today, we are providing you with this personalized preventive plan of services (PPPS). This plan is based upon recommendations of the [place] Preventive Services Task Force (USPSTF) and the Advisory Committee on Immunization Practices (ACIP).    This lists the preventive care services that should be considered, and provides dates of when you are due. Items listed as completed are up-to-date and do not require any further intervention.    Health Maintenance   Topic Date Due    Pneumococcal Vaccine 65+ (1 - PCV) Never done    TDAP/TD VACCINES (1 - Tdap) Never done    ZOSTER VACCINE (1 of 2) Never done    ANNUAL WELLNESS VISIT  2023    INFLUENZA VACCINE  2023    LIPID PANEL  2024    DXA SCAN  2024    COVID-19 Vaccine  Completed       No orders of the defined types were placed in this encounter.      Return if symptoms worsen or fail to improve, for Next scheduled follow up.

## 2023-04-18 NOTE — PROGRESS NOTES
The ABCs of the Annual Wellness Visit  Subsequent Medicare Wellness Visit    Subjective    Corey Maki is a 82 y.o. male who presents for a Subsequent Medicare Wellness Visit.    The following portions of the patient's history were reviewed and   updated as appropriate: allergies, current medications, past family history, past medical history, past social history, past surgical history and problem list.    Compared to one year ago, the patient feels his physical   health is worse.    Compared to one year ago, the patient feels his mental   health is the same.    Recent Hospitalizations:  He was not admitted to the hospital during the last year.       Current Medical Providers:  Patient Care Team:  Keegan Sainz MD as PCP - General (Internal Medicine)    Outpatient Medications Prior to Visit   Medication Sig Dispense Refill   • amLODIPine (NORVASC) 10 MG tablet Take 1 tablet by mouth Every Night. 90 tablet 3   • azelastine (ASTELIN) 0.1 % nasal spray 2 sprays into the nostril(s) as directed by provider 2 (Two) Times a Day. Use in each nostril as directed 30 mL 11   • carvedilol (COREG) 3.125 MG tablet Take 1 tablet by mouth 2 (Two) Times a Day.     • Cholecalciferol (VITAMIN D3) 5000 UNITS capsule capsule Take 1 capsule by mouth Every Other Day.     • fexofenadine (ALLEGRA) 180 MG tablet Take 1 tablet by mouth Daily.     • losartan (Cozaar) 25 MG tablet Take 1 tablet by mouth Daily. 90 tablet 3   • montelukast (SINGULAIR) 10 MG tablet Take 1 tablet by mouth At Night As Needed. 30 tablet 1   • pantoprazole (PROTONIX) 40 MG EC tablet Take 1 tablet by mouth Daily.     • fluticasone (FLONASE) 50 MCG/ACT nasal spray 1 spray into the nostril(s) as directed by provider 2 (Two) Times a Day As Needed for Rhinitis or Allergies. 1 bottle 11   • acetaminophen (Tylenol 8 Hour) 650 MG 8 hr tablet Take 2 tablets by mouth 2 (Two) Times a Day.     • rosuvastatin (CRESTOR) 10 MG tablet Take 1 tablet by mouth Daily.       No  "facility-administered medications prior to visit.       No opioid medication identified on active medication list. I have reviewed chart for other potential  high risk medication/s and harmful drug interactions in the elderly.          Aspirin is not on active medication list.  Aspirin use is not indicated based on review of current medical condition/s. Risk of harm outweighs potential benefits.  .    Patient Active Problem List   Diagnosis   • Screening for malignant neoplasm of prostate   • Osteoporosis - started Fosamax 12/2017, but refusing prescription osteoporosis treatment. H/o for thoracic/lumbar vertebral compression fractures   • History of fracture of vertebral column   • GERD (gastroesophageal reflux disease)   • Diverticular disease of colon   • Allergic rhinitis   • Sinus bradycardia   • Resting tremor   • Essential hypertension   • IFG (impaired fasting glucose)   • Bradycardia   • Chronic low back pain with left-sided sciatica   • Benign prostatic hyperplasia with urinary frequency -  h/o urinary retention with Claritin D   • Chronic cough -multifactorial, PND, upper airway syndrome, possible GERD.  Dr. Monet   • Postnasal drip -chronic   • Precordial pain   • Chronic maxillary sinusitis   • Chronic rhinitis   • Dysfunction of left eustachian tube     Advance Care Planning   Advance Care Planning     Advance Directive is not on file.  ACP discussion was held with the patient during this visit. Patient has an advance directive (not in EMR), copy requested.     Objective    Vitals:    04/18/23 1113   BP: 158/98   Pulse: 57   Temp: 97.9 °F (36.6 °C)   SpO2: 99%   Weight: 81.3 kg (179 lb 3.2 oz)   Height: 185.4 cm (73\")   PainSc:   5   PainLoc: Back     Estimated body mass index is 23.64 kg/m² as calculated from the following:    Height as of this encounter: 185.4 cm (73\").    Weight as of this encounter: 81.3 kg (179 lb 3.2 oz).    BMI is within normal parameters. No other follow-up for BMI " required.      Does the patient have evidence of cognitive impairment? No          HEALTH RISK ASSESSMENT    Smoking Status:  Social History     Tobacco Use   Smoking Status Former   • Packs/day: 0.50   • Years: 10.00   • Pack years: 5.00   • Types: Cigarettes   • Quit date:    • Years since quittin.3   • Passive exposure: Past   Smokeless Tobacco Never     Alcohol Consumption:  Social History     Substance and Sexual Activity   Alcohol Use No     Fall Risk Screen:    JOHNY Fall Risk Assessment was completed, and patient is at MODERATE risk for falls. Assessment completed on:2023    Depression Screenin/18/2023    11:17 AM   PHQ-2/PHQ-9 Depression Screening   Little Interest or Pleasure in Doing Things 0-->not at all   Feeling Down, Depressed or Hopeless 1-->several days   PHQ-9: Brief Depression Severity Measure Score 1       Health Habits and Functional and Cognitive Screenin/18/2023    11:15 AM   Functional & Cognitive Status   Do you have difficulty preparing food and eating? No   Do you have difficulty bathing yourself, getting dressed or grooming yourself? No   Do you have difficulty using the toilet? No   Do you have difficulty moving around from place to place? Yes   Do you have trouble with steps or getting out of a bed or a chair? Yes   Current Diet Limited Junk Food   Dental Exam Up to date   Eye Exam Not up to date   Exercise (times per week) 5 times per week   Current Exercises Include Walking   Do you need help using the phone?  No   Are you deaf or do you have serious difficulty hearing?  Yes   Do you need help with transportation? No   Do you need help shopping? No   Do you need help preparing meals?  No   Do you need help with housework?  No   Do you need help with laundry? No   Do you need help taking your medications? No   Do you need help managing money? No   Do you ever drive or ride in a car without wearing a seat belt? No   Have you felt unusual stress, anger or  loneliness in the last month? Yes   Who do you live with? Alone   If you need help, do you have trouble finding someone available to you? No   Have you been bothered in the last four weeks by sexual problems? No   Do you have difficulty concentrating, remembering or making decisions? No       Age-appropriate Screening Schedule:  Refer to the list below for future screening recommendations based on patient's age, sex and/or medical conditions. Orders for these recommended tests are listed in the plan section. The patient has been provided with a written plan.    Health Maintenance   Topic Date Due   • TDAP/TD VACCINES (1 - Tdap) Never done   • ZOSTER VACCINE (1 of 2) Never done   • INFLUENZA VACCINE  08/01/2023   • LIPID PANEL  01/12/2024   • DXA SCAN  01/14/2024   • ANNUAL WELLNESS VISIT  04/18/2024   • COVID-19 Vaccine  Completed   • Pneumococcal Vaccine 65+  Completed                  CMS Preventative Services Quick Reference  Risk Factors Identified During Encounter  Chronic Pain: OTC analgesics as needed. Proper dosing schedule discussed.    X-rays ordered today as well. .     TDAP, Shingrix and Prevnar 20 available.      After informed verbal consent, patient is given Prevnar 20.  He tolerated well without difficulty.    We can plan to give TDAP with next skin laceration or abrasion.        The above risks/problems have been discussed with the patient.  Pertinent information has been shared with the patient in the After Visit Summary.  An After Visit Summary and PPPS were made available to the patient.    Follow Up:   Next Medicare Wellness visit to be scheduled in 1 year.       Additional E&M Note during same encounter follows:  Patient has multiple medical problems which are significant and separately identifiable that require additional work above and beyond the Medicare Wellness Visit.      Chief Complaint  Back Pain (Lumbar and having constant pain ln left side x years but getting worse and hard to  tolerate. At times pain radiates down bilateral legs. Doesn't take anything for pain), Medicare Wellness-subsequent, Sinusitis (Allergies form 1 year and has been seeing Dr. Garcia. He has been awake since 3 am d/t this. Has drainage down throat. Sometimes he uses Claritin with Sudafed, he is currently using Astelin nasal spray ), and Immunizations (Prevnar 20 given left deltoid and tolerated procedure well )    Subjective        HPI  Corey Maki is also being seen today for other issues.  He reports persistent  low back and hip pain which has been worsening with increasing frequency and intensity.  He describes pain today in the left SI region and left iliac crest with muscle spasm on the left.  Not much pain is reproduced with palpation.  He has not been taking anything for the pain. He has a history of vertebral compression fractures.  He refuses treatment for osteoporosis.  Lumbar x-rays 07/2021 reveals severe multilevel degenerative changes and also prominent arthritic changes of the lumbar spine.  He continues to walk for exercise with use of a cane to help with instability and has some occasional balance issues with getting up off of the toilet.  He denies orthostatic symptoms.  We will get updated x-rays of hips and lumbar spine today.  We discussed options including referral to pain management, which she is willing to consider, but declines referral today.    Patient is having a flare of chronic allergic rhinitis.  He has impressive chronic postnasal drip.  He normally uses Astelin nasal spray 2 sprays each nostril each morning and has recently started taking Claritin D 12- hour each afternoon.  He denies urinary retention, although, we discussed the possibility of this side effect with use of the antihistamine/decongestant.    His blood pressure is above goal today, but is better when he has been checking it at home.  Blood pressure was better with his visit 3 months ago.          Objective   Vital  "Signs:  /98   Pulse 57   Temp 97.9 °F (36.6 °C)   Ht 185.4 cm (73\")   Wt 81.3 kg (179 lb 3.2 oz)   SpO2 99%   BMI 23.64 kg/m²     Physical Exam  Vitals reviewed.   Constitutional:       General: He is not in acute distress.     Appearance: He is well-developed.      Comments: Pleasant gentleman.    HENT:      Head: Normocephalic and atraumatic.      Comments: Facemask in place     Nose:      Right Sinus: No maxillary sinus tenderness or frontal sinus tenderness.      Left Sinus: No maxillary sinus tenderness or frontal sinus tenderness.      Mouth/Throat:      Mouth: No oral lesions.      Pharynx: Uvula midline.      Tonsils: No tonsillar exudate.   Eyes:      Conjunctiva/sclera: Conjunctivae normal.      Pupils: Pupils are equal, round, and reactive to light.   Neck:      Thyroid: No thyroid mass or thyromegaly.      Vascular: No carotid bruit or JVD.      Trachea: Trachea normal. No tracheal deviation.   Cardiovascular:      Rate and Rhythm: Normal rate and regular rhythm.  No extrasystoles are present.     Chest Wall: PMI is not displaced.      Heart sounds: Normal heart sounds. No murmur heard.  Pulmonary:      Effort: Pulmonary effort is normal. No accessory muscle usage or respiratory distress.      Breath sounds: Normal breath sounds. No decreased breath sounds, wheezing, rhonchi or rales.   Abdominal:      General: Bowel sounds are normal. There is no distension.      Palpations: Abdomen is soft.      Tenderness: There is no abdominal tenderness.   Musculoskeletal:      Cervical back: Neck supple.      Comments: Exam reveals mild pain and moderate inflammation in region of left SI joint and minimal tenderness palpating left iliac crest, with increased paraspinal muscle spasm on the left     Lymphadenopathy:      Cervical: No cervical adenopathy.   Skin:     General: Skin is warm and dry.      Findings: No rash.      Nails: There is no clubbing.   Neurological:      Mental Status: He is alert and " oriented to person, place, and time. Mental status is at baseline.      Cranial Nerves: No cranial nerve deficit.      Coordination: Coordination normal.      Gait: Gait normal.      Comments: He has no difficulty transferring from the seated to standing position.  He ambulated down the flores without difficulty.   Psychiatric:         Speech: Speech normal.         Behavior: Behavior normal.         Thought Content: Thought content normal.         Judgment: Judgment normal.                Renal Profile        1/12/2023    07:37   Renal Profile   BUN 18     Creatinine 1.11       A1C Last 3 Results        1/12/2023    07:37   HGBA1C Last 3 Results   Hemoglobin A1C 5.60       Data reviewed: Radiologic studies Lumbar x-rays 07/2021      Future Appointments   Date Time Provider Department Center   5/5/2023 10:55 AM Elias Garcia MD [facility] None   1/22/2024 10:30 AM Keegan Sainz MD [facility]          Assessment and Plan   Diagnoses and all orders for this visit:    1. Medicare annual wellness visit, subsequent (Primary)    2. Need for pneumococcal vaccine    3. Left lumbar radiculopathy  -     XR Spine Lumbar Complete 4+VW    4. Bilateral hip pain  -     XR Hips Bilateral With or Without Pelvis 3-4 View    5. Postnasal drip    6. Chronic rhinitis    7. Seasonal allergic rhinitis, unspecified trigger  -     Ambulatory Referral to Allergy    8. Essential hypertension    Other orders  -     fluticasone (FLONASE) 50 MCG/ACT nasal spray; 1 spray into the nostril(s) as directed by provider 2 (Two) Times a Day As Needed for Rhinitis or Allergies.  Dispense: 15.8 mL; Refill: 6  -     Pneumococcal Conjugate Vaccine 20-[facility]           I spent 31 minutes caring for Corey on this date of service. This time includes time spent by me in the following activities:preparing for the visit, reviewing tests, performing a medically appropriate examination and/or evaluation , counseling and educating the  patient/family/caregiver, ordering medications, tests, or procedures, referring and communicating with other health care professionals  and documenting information in the medical record.  This does not include time spent on his Medicare AWV today.    Orders are placed for patient to obtain x-rays of lumbar spine and bilateral hips today.  We will notify patient with results.  Recommended OTC Tylenol Arthritis to take 2 tablets q.a.m. and repeat 2 tablets 8 hours later if needed.  I offered referral to pain management.  He is considering this, but would like to hold off for now.  Notify us if no improvement and we can refer to pain management with MRI prior.  There might even be a surgical option for him.  Continue using his cane to help with ambulation and stability.  Fall precautions.    For the chronic allergic rhinitis and seasonal allergies resulting in an undesirable amount of postnasal drip, continue using the Astelin nasal spray, although, he may find more consistent coverage by using one spray each nostril b.i.d.  Resume Flonase nasal spray one spray each nostril b.i.d.  He is currently taking Claritin-D 12 hour each afternoon.  I cautioned patient to monitor closely for urinary retention with the daily antihistamine/decongestant combination.  Refer to Dr. Terry, allergist.   He may use Ruby bottle sinus irrigation daily as needed.  Current symptoms would likely improve with steroids, but no steroids due to his untreated/undertreated osteoporosis.    He mentions his prior vertebral compression fractures as possibly contributing to current symptoms.  I reminded him that he has osteoporosis and has declined treatment.  He did take Fosamax for a while, but has not been willing to take any prescription medication for the past couple years.  He has not changed his opinion on osteoporosis treatment.  Continue calcium/vitamin D supplement and weightbearing exercises.  He walks daily.  Fall precautions.    Blood  pressure was above goal today, but it is usually good in the office and has been good at home.  Continue monitoring blood pressure and continue to pursue low-sodium diet and regular exercise.    Return 01/22/2024 for routine follow up with fasting labs one week prior or sooner if needed.     Scribed for Dr. Sainz by Lorraine Patel [facility].     Follow Up   Return if symptoms worsen or fail to improve, for Next scheduled follow up.  Patient was given instructions and counseling regarding his condition or for health maintenance advice. Please see specific information pulled into the AVS if appropriate.

## 2023-05-05 ENCOUNTER — OFFICE VISIT (OUTPATIENT)
Dept: FAMILY MEDICINE CLINIC | Facility: CLINIC | Age: 83
End: 2023-05-05
Payer: MEDICARE

## 2023-05-05 ENCOUNTER — OFFICE VISIT (OUTPATIENT)
Dept: OTOLARYNGOLOGY | Facility: CLINIC | Age: 83
End: 2023-05-05
Payer: COMMERCIAL

## 2023-05-05 VITALS — WEIGHT: 179 LBS | TEMPERATURE: 97.6 F | BODY MASS INDEX: 23.72 KG/M2 | HEIGHT: 73 IN

## 2023-05-05 VITALS
OXYGEN SATURATION: 96 % | TEMPERATURE: 96.5 F | BODY MASS INDEX: 23.94 KG/M2 | HEART RATE: 55 BPM | SYSTOLIC BLOOD PRESSURE: 120 MMHG | WEIGHT: 180.6 LBS | DIASTOLIC BLOOD PRESSURE: 74 MMHG | HEIGHT: 73 IN

## 2023-05-05 DIAGNOSIS — J30.1 SEASONAL ALLERGIC RHINITIS DUE TO POLLEN: Chronic | ICD-10-CM

## 2023-05-05 DIAGNOSIS — E55.9 VITAMIN D DEFICIENCY: Chronic | ICD-10-CM

## 2023-05-05 DIAGNOSIS — R09.82 POSTNASAL DRIP: ICD-10-CM

## 2023-05-05 DIAGNOSIS — J31.0 CHRONIC RHINITIS: ICD-10-CM

## 2023-05-05 DIAGNOSIS — H69.02 PATULOUS EUSTACHIAN TUBE OF LEFT EAR: Primary | ICD-10-CM

## 2023-05-05 DIAGNOSIS — M81.0 AGE-RELATED OSTEOPOROSIS WITHOUT CURRENT PATHOLOGICAL FRACTURE: Primary | Chronic | ICD-10-CM

## 2023-05-05 DIAGNOSIS — I10 ESSENTIAL HYPERTENSION: Chronic | ICD-10-CM

## 2023-05-05 DIAGNOSIS — H69.82 DYSFUNCTION OF LEFT EUSTACHIAN TUBE: Chronic | ICD-10-CM

## 2023-05-05 PROCEDURE — 99214 OFFICE O/P EST MOD 30 MIN: CPT | Performed by: INTERNAL MEDICINE

## 2023-05-05 PROCEDURE — 99213 OFFICE O/P EST LOW 20 MIN: CPT | Performed by: OTOLARYNGOLOGY

## 2023-05-05 PROCEDURE — 3078F DIAST BP <80 MM HG: CPT | Performed by: INTERNAL MEDICINE

## 2023-05-05 PROCEDURE — 3074F SYST BP LT 130 MM HG: CPT | Performed by: INTERNAL MEDICINE

## 2023-05-05 RX ORDER — CAL/D3/MAG11/ZINC/COP/MANG/BOR 600 MG-800
1 TABLET ORAL DAILY
COMMUNITY

## 2023-05-05 RX ORDER — GUAIFENESIN 600 MG/1
600 TABLET, EXTENDED RELEASE ORAL 2 TIMES DAILY
Qty: 60 TABLET | Refills: 11 | Status: SHIPPED | OUTPATIENT
Start: 2023-05-05

## 2023-05-05 RX ORDER — IBANDRONATE SODIUM 150 MG/1
150 TABLET, FILM COATED ORAL
Qty: 3 TABLET | Refills: 3 | Status: SHIPPED | OUTPATIENT
Start: 2023-05-05

## 2023-05-05 RX ORDER — MELATONIN
2000 DAILY
COMMUNITY

## 2023-05-05 NOTE — PROGRESS NOTES
"Chief Complaint  Osteoporosis    Subjective        History of Present Illness     Corey Maki is here for follow up on recent x-rays of lumbar spine and bilateral hips and to discuss treatment for osteoporosis.  He was in the office 2 1/2 weeks ago with symptoms of left lumbar radiculopathy and bilateral hip pain.  Hip x-rays 04/28/2023 revealed mild degenerative changes.  Lumbar x-rays revealed chronic compression deformities at L1 and L2  and compression deformity of the superior endplate of L3, which has slightly increased compared to patient's prior examination increasing his fracture risk.   His most recent DEXA 01/2022 continued to reveal osteoporosis of the hip.  He took Fosamax for a short time in 2017, but has been declining prescription medication to treat osteoporosis since that time.  He is more willing to consider treatment today after discussing his treatment options and increased risk.  He is also willing to see endocrinology.  He is currently taking Protonix for GERD/GI protection.     Patient saw Dr. Elias Garcia, ENT, earlier today for follow up on   He was given prescription for Mucinex to use twice daily for chroinc ETD left ear.  Patient frequently has to use sinus rinse during the night and has been using Astelin and Flonase nasal sprays twice daily.  Patient states Dr. Gracia felt the nasal sprays may be excessively drying out the sinuses.       Objective   Vital Signs:  /74 (BP Location: Left arm, Patient Position: Sitting, Cuff Size: Large Adult)   Pulse 55   Temp 96.5 °F (35.8 °C) (Oral)   Ht 185.4 cm (72.99\")   Wt 81.9 kg (180 lb 9.6 oz)   SpO2 96%   BMI 23.83 kg/m²   Estimated body mass index is 23.83 kg/m² as calculated from the following:    Height as of this encounter: 185.4 cm (72.99\").    Weight as of this encounter: 81.9 kg (180 lb 9.6 oz).       BMI is within normal parameters. No other follow-up for BMI required.      Physical Exam  Vitals reviewed.   Constitutional: "       General: He is not in acute distress.     Appearance: He is well-developed.      Comments: Pleasant male.    HENT:      Head: Normocephalic and atraumatic.      Nose:      Right Sinus: No maxillary sinus tenderness or frontal sinus tenderness.      Left Sinus: No maxillary sinus tenderness or frontal sinus tenderness.      Mouth/Throat:      Mouth: No oral lesions.      Pharynx: Uvula midline.      Tonsils: No tonsillar exudate.   Eyes:      Conjunctiva/sclera: Conjunctivae normal.      Pupils: Pupils are equal, round, and reactive to light.   Neck:      Thyroid: No thyroid mass or thyromegaly.      Vascular: No carotid bruit or JVD.      Trachea: Trachea normal. No tracheal deviation.   Cardiovascular:      Rate and Rhythm: Normal rate and regular rhythm.  No extrasystoles are present.     Chest Wall: PMI is not displaced.      Heart sounds: Normal heart sounds. No murmur heard.  Pulmonary:      Effort: Pulmonary effort is normal. No accessory muscle usage or respiratory distress.      Breath sounds: Normal breath sounds. No decreased breath sounds, wheezing, rhonchi or rales.   Abdominal:      General: Bowel sounds are normal. There is no distension.      Palpations: Abdomen is soft.      Tenderness: There is no abdominal tenderness.   Musculoskeletal:      Cervical back: Neck supple.   Lymphadenopathy:      Cervical: No cervical adenopathy.   Skin:     General: Skin is warm and dry.      Findings: No rash.      Nails: There is no clubbing.   Neurological:      Mental Status: He is alert and oriented to person, place, and time.      Cranial Nerves: No cranial nerve deficit.      Coordination: Coordination normal.   Psychiatric:         Speech: Speech normal.         Behavior: Behavior normal.         Thought Content: Thought content normal.         Judgment: Judgment normal.            Result Review :    Common labs        1/12/2023    07:37   Common Labs   Glucose 100     BUN 18     Creatinine 1.11      Sodium 141     Potassium 3.9     Chloride 103     Calcium 9.7     Albumin 4.2     Total Bilirubin 0.8     Alkaline Phosphatase 102     AST (SGOT) 21     ALT (SGPT) 16     WBC 7.09     Hemoglobin 14.7     Hematocrit 43.7     Platelets 147     Total Cholesterol 191     Triglycerides 80     HDL Cholesterol 54     LDL Cholesterol  122     Hemoglobin A1C 5.60     PSA 0.361       Data reviewed: Radiologic studies DEXA 01/2022.  Hip and back x-rays 04/28/2023    Future Appointments   Date Time Provider Department Center   7/11/2023  2:15 PM Elias Garcia MD [facility] None   1/22/2024 10:30 AM Keegan Sainz MD [facility]            Assessment and Plan   Diagnoses and all orders for this visit:    1. Age-related osteoporosis without current pathological fracture (Primary)  -     Ambulatory Referral to Endocrinology    2. Vitamin D deficiency  -     Ambulatory Referral to Endocrinology    3. Postnasal drip    4. Dysfunction of left eustachian tube    5. Seasonal allergic rhinitis due to pollen    6. Essential hypertension    Other orders  -     ibandronate (Boniva) 150 MG tablet; Take 1 tablet by mouth Every 30 (Thirty) Days.  Dispense: 3 tablet; Refill: 3           I spent 31 minutes caring for Corey on this date of service. This time includes time spent by me in the following activities:preparing for the visit, reviewing tests, obtaining and/or reviewing a separately obtained history, performing a medically appropriate examination and/or evaluation , counseling and educating the patient/family/caregiver, ordering medications, tests, or procedures, referring and communicating with other health care professionals  and documenting information in the medical record     After reviewing recent x-ray results of bilateral hips and lumbar spines revealing compression fractures and increased fracture risk, we discussed the osteoporosis revealed on DEXA last year.  At that time, patient was reluctant to take  medication to treat the osteoporosis, but today after further discussion today, he is willing to try monthly Boniva to take as directed.  I suggested Forteo for 1 year followed by bisphosphonate therapy, but he declines injectable Forteo.  We will also refer to Dr. Fabian Cooper, endocrinology for additional opinion/advice.  We will have him continue the vitamin D 2000 IU daily and add daily calcium/vitamin D supplement (Caltrate) once daily.  Continue to walk daily for exercise.  Continue fall precautions.  Advised walking with a walking stick for more stability, as his ETD issues caused him to feel unsteady at times.    I recommended patient start the Mucinex as prescribed by Dr. Garcia for the chronic postnasal drip and eustachian tube dysfunction.  He can try using his steroid nasal sprays one daily prior to bedtime only, but resume twice daily dosing if symptoms worsen instead of improving.. He may benefit from using the saline sinus rinse prior to bedtime, as he struggles with tenacious postnasal drip in the middle of the night frequently.   Hydrate well. He has a follow up with Dr. Garcia scheduled for July.    Blood pressure and heart rate are well controlled today.  Continue Norvasc, losartan, and Coreg.  Continue sodium restriction and daily walking for exercise.    Return 01/2024 for routine follow up with fasting last one week prior or sooner if needed.     Scribed for Dr. Sainz by Lorraine Patel [facility].     Follow Up   Return if symptoms worsen or fail to improve, for Next scheduled follow up.  Patient was given instructions and counseling regarding his condition or for health maintenance advice. Please see specific information pulled into the AVS if appropriate.        2.09

## 2023-05-10 NOTE — PROGRESS NOTES
Subjective   Corey Maki is a 83 y.o. male.       History of Present Illness   Patient is followed with patulous eustachian tube dysfunction of the left ear and chronic rhinitis.  Reports his left ear continues to give him problems with popping and cracking and he can hear himself breathing in that ear.  His nose is congested at night.  He uses Astelin and saline but still feels like he has thick drainage.      The following portions of the patient's history were reviewed and updated as appropriate: allergies, current medications, past family history, past medical history, past social history, past surgical history and problem list.     reports that he quit smoking about 63 years ago. His smoking use included cigarettes. He has a 5.00 pack-year smoking history. He has been exposed to tobacco smoke. He has never used smokeless tobacco. He reports that he does not drink alcohol and does not use drugs.   Patient is not a tobacco user and has not been counseled for use of tobacco products      Review of Systems        Objective   Physical Exam  Ears: External ears no deformity, canals no discharge, tympanic membranes intact clear and mobile bilaterally.  Nares no discharge mass polyp or purulence.  Oral cavity no masses or lesions  Pharynx no erythema or exudate  Neck no adenopathy or mass         Assessment and Plan   Diagnoses and all orders for this visit:    1. Patulous eustachian tube of left ear (Primary)    2. Chronic rhinitis    Other orders  -     guaiFENesin (Mucinex) 600 MG 12 hr tablet; Take 1 tablet by mouth 2 (Two) Times a Day.  Dispense: 60 tablet; Refill: 11           Plan: Add Mucinex twice daily.  Return in 2 months.

## 2023-05-18 ENCOUNTER — LAB (OUTPATIENT)
Dept: LAB | Facility: OTHER | Age: 83
End: 2023-05-18
Payer: COMMERCIAL

## 2023-05-18 ENCOUNTER — TRANSCRIBE ORDERS (OUTPATIENT)
Dept: LAB | Facility: OTHER | Age: 83
End: 2023-05-18
Payer: COMMERCIAL

## 2023-05-18 DIAGNOSIS — J31.0 CHRONIC RHINITIS: Primary | ICD-10-CM

## 2023-05-18 DIAGNOSIS — J31.0 CHRONIC RHINITIS: ICD-10-CM

## 2023-05-18 PROCEDURE — 82785 ASSAY OF IGE: CPT | Performed by: ALLERGY & IMMUNOLOGY

## 2023-05-18 PROCEDURE — 86003 ALLG SPEC IGE CRUDE XTRC EA: CPT | Performed by: ALLERGY & IMMUNOLOGY

## 2023-05-18 PROCEDURE — 36415 COLL VENOUS BLD VENIPUNCTURE: CPT | Performed by: INTERNAL MEDICINE

## 2023-05-25 LAB — IGE SERPL-ACNC: 51 IU/ML (ref 6–495)

## 2023-05-26 LAB
A ALTERNATA IGE QN: <0.1 KU/L
A FUMIGATUS IGE QN: <0.1 KU/L
AMER ROACH IGE QN: <0.1 KU/L
BERMUDA GRASS IGE QN: <0.1 KU/L
BOXELDER IGE QN: <0.1 KU/L
C ALBICANS IGE QN: 1.62 KU/L
C HERBARUM IGE QN: <0.1 KU/L
C SPECIFERA IGE QN: <0.1 KU/L
CARELESS WEED IGE QN: <0.1 KU/L
CAT DANDER IGE QN: <0.1 KU/L
COCKLEBUR IGE QN: <0.1 KU/L
COMMON RAGWEED IGE QN: <0.1 KU/L
CONV CLASS DESCRIPTION: ABNORMAL
COW DANDER IGE QN: <0.1 KU/L
D FARINAE IGE QN: 3.56 KU/L
D PTERONYSS IGE QN: 1.86 KU/L
DOG DANDER IGE QN: <0.1 KU/L
EAST WHITE PINE IGE QN: <0.1 KU/L
ENGL PLANTAIN IGE QN: <0.1 KU/L
GOOSE FEATHER IGE QN: <0.1 KU/L
GOOSEFOOT IGE QN: <0.1 KU/L
HORSE EPITH IGE QN: <0.1 KU/L
HOUSE DUST HS IGE QN: 0.24 KU/L
JOHNSON GRASS IGE QN: <0.1 KU/L
KENT BLUE GRASS IGE QN: <0.1 KU/L
LONDON PLANE IGE QN: <0.1 KU/L
MT JUNIPER IGE QN: <0.1 KU/L
P NOTATUM IGE QN: <0.1 KU/L
S ROSTRATA IGE QN: <0.1 KU/L
SHEEP SORREL IGE QN: <0.1 KU/L
VIRG LIVE OAK IGE QN: <0.1 KU/L
WHITE ASH IGE QN: <0.1 KU/L
WHITE ELM IGE QN: <0.1 KU/L
WHITE HICKORY IGE QN: <0.1 KU/L

## 2023-06-07 ENCOUNTER — OFFICE VISIT (OUTPATIENT)
Dept: FAMILY MEDICINE CLINIC | Facility: CLINIC | Age: 83
End: 2023-06-07
Payer: MEDICARE

## 2023-06-07 VITALS
TEMPERATURE: 97 F | OXYGEN SATURATION: 97 % | DIASTOLIC BLOOD PRESSURE: 78 MMHG | HEART RATE: 52 BPM | SYSTOLIC BLOOD PRESSURE: 134 MMHG | WEIGHT: 180.2 LBS | HEIGHT: 73 IN | BODY MASS INDEX: 23.88 KG/M2

## 2023-06-07 DIAGNOSIS — M81.0 AGE-RELATED OSTEOPOROSIS WITHOUT CURRENT PATHOLOGICAL FRACTURE: Chronic | ICD-10-CM

## 2023-06-07 DIAGNOSIS — S32.000S CLOSED WEDGE FRACTURE OF LUMBAR VERTEBRA, UNSPECIFIED LUMBAR VERTEBRAL LEVEL, SEQUELA: Chronic | ICD-10-CM

## 2023-06-07 DIAGNOSIS — L82.1 SK (SEBORRHEIC KERATOSIS): ICD-10-CM

## 2023-06-07 DIAGNOSIS — R09.82 ALLERGIC RHINITIS WITH POSTNASAL DRIP: ICD-10-CM

## 2023-06-07 DIAGNOSIS — I10 ESSENTIAL HYPERTENSION: Chronic | ICD-10-CM

## 2023-06-07 DIAGNOSIS — E55.9 VITAMIN D DEFICIENCY: ICD-10-CM

## 2023-06-07 DIAGNOSIS — L57.0 AK (ACTINIC KERATOSIS): ICD-10-CM

## 2023-06-07 DIAGNOSIS — R09.89 GLOBUS SENSATION: Primary | ICD-10-CM

## 2023-06-07 DIAGNOSIS — J30.9 ALLERGIC RHINITIS WITH POSTNASAL DRIP: ICD-10-CM

## 2023-06-07 PROBLEM — S32.009A LUMBAR VERTEBRAL FRACTURE: Status: ACTIVE | Noted: 2023-06-07

## 2023-06-07 PROBLEM — S32.009A LUMBAR VERTEBRAL FRACTURE: Chronic | Status: ACTIVE | Noted: 2023-06-07

## 2023-06-07 RX ORDER — FAMOTIDINE 40 MG/1
40 TABLET, FILM COATED ORAL
COMMUNITY
Start: 2023-05-17

## 2023-06-07 NOTE — PROGRESS NOTES
"Chief Complaint  non healing lesion (On forehead)    Subjective            History of Present Illness    Corey Maki presents to the office with sun damaged skin and scattered skin lesions.   Cryotherapy destruction performed today.  See procedure note below.        Patient saw a nurse practitioner at [facility], but didn't feel he got any definite answers regarding the globus sensation.  EGD was recommended as well as continuing on PPI.  He is not inclined to take the advised to proceed with repeat EGD.  He denies history of Garcia's esophagus.  Patient denies dysphagia, although, continues to have a lot of mucous production resulting in postnasal drip issues and some chronic hoarseness.  Dr. Terry is also investigating his symptoms and has ordered food allergy panels.  He is to follow-up with Dr. Terry soon.  Symptoms are essentially unchanged.    Patient has not started the monthly Boniva  prescribed for osteoporosis revealed on recent DEXA he voices reluctance to take the Boniva or treat his osteoporosis.  See last note for full details.  He has basically delayed treatment of osteoporosis since 2017 and understands the impact this is having as his osteoporosis progresses.  He has multiple compression fractures of thoracic and lumbar vertebra.  With his last visit, he did agree to a referral to endocrinology to discuss this issue.  He has an appointment scheduled with Dr. Cooper, endocrinology, in September.       His blood pressure is reasonable today with Norvasc, Coreg, and losartan.      Objective   Vital Signs:  /78   Pulse 52   Temp 97 °F (36.1 °C)   Ht 185.4 cm (73\")   Wt 81.7 kg (180 lb 3.2 oz)   SpO2 97%   BMI 23.77 kg/m²   Estimated body mass index is 23.77 kg/m² as calculated from the following:    Height as of this encounter: 185.4 cm (73\").    Weight as of this encounter: 81.7 kg (180 lb 3.2 oz).       BMI is within normal parameters. No other follow-up for BMI " required.      Physical Exam  Constitutional:       General: He is not in acute distress.     Appearance: He is well-developed.      Comments: Pleasant elderly gentleman   HENT:      Head: Normocephalic and atraumatic.      Nose: Nose normal.      Mouth/Throat:      Pharynx: No oropharyngeal exudate.   Eyes:      Pupils: Pupils are equal, round, and reactive to light.   Neck:      Thyroid: No thyromegaly.      Vascular: No JVD.   Cardiovascular:      Rate and Rhythm: Normal rate and regular rhythm.      Heart sounds: Normal heart sounds.   Pulmonary:      Effort: Pulmonary effort is normal. No accessory muscle usage or respiratory distress.      Breath sounds: Normal breath sounds. No wheezing or rales.   Abdominal:      General: Bowel sounds are normal. There is no distension.      Palpations: Abdomen is soft.      Tenderness: There is no abdominal tenderness.   Musculoskeletal:      Cervical back: Neck supple.   Lymphadenopathy:      Cervical: No cervical adenopathy.   Skin:     Comments: Exam reveals sun-damaged skin with a total of 9 benign appearing skin lesions including 2 seborrheic keratosis and a hemangioma clustered together on left forehead as well as 2 actinic keratosis on right side of scalp, 1 actinic keratosis on right temple area, 1 actinic keratoses on right forearm and 2 actinic keratoses on left forearm     Neurological:      Mental Status: He is alert and oriented to person, place, and time.      Cranial Nerves: No cranial nerve deficit.   Psychiatric:         Speech: Speech normal.         Behavior: Behavior normal.         Thought Content: Thought content normal.         Judgment: Judgment normal.          Result Review :            Cryotherapy, Skin Lesion    Date/Time: 6/7/2023 3:30 PM  Performed by: Keegan Sainz MD  Authorized by: Keeagn Sainz MD   Local anesthesia used: no    Anesthesia:  Local anesthesia used: no    Sedation:  Patient sedated: no    Patient tolerance: patient tolerated  the procedure well with no immediate complications  Comments: After obtaining verbal informed consent, cryotherapy performed on 2 inflamed/irritated SKs left forehead and 6 AK's total on upper extremities and face/head as described in the physical exam.  Total procedure time: 14 minutes          Assessment and Plan   Diagnoses and all orders for this visit:    1. Globus sensation (Primary)    2. Allergic rhinitis with postnasal drip    3. Essential hypertension    4. Age-related osteoporosis without current pathological fracture    5. Closed wedge fracture of lumbar vertebra, unspecified lumbar vertebral level, sequela    6. AK (actinic keratosis)  -     Cryotherapy, Skin Lesion    7. SK (seborrheic keratosis)  -     Cryotherapy, Skin Lesion    8. Vitamin D deficiency      Future Appointments   Date Time Provider Department Center   7/11/2023  2:15 PM Elias Garcia MD [facility] None   9/19/2023 11:00 AM Lukas Santos MD [facility]   1/22/2024 10:30 AM Keegan Sainz MD [facility]             After informed verbal consent, cryotherapy is used to destroy a total of 8 benign appearing skin lesions including 2 seborrheic keratosis on left forehead as well as 2 actinic keratosis on right side of scalp, 1 actinic keratosis on right temple area, 1 actinic keratoses on right forearm and 2 actinic keratoses on left forearm.  Recommended safe sun exposure measures including applying sunscreen prior to sun exposure and wearing protective clothing to block UV light with sun exposure.    Keep appointment with Dr. Cooper, endocrinology, to follow up on osteoporosis.  Encouraged to start the monthly Boniva, but it sounds like he is leaning against treatment once again.  Continue the calcium and vitamin D supplements.  Fall precautions.  Continue walking regularly for weightbearing exercise to strengthen the bones we have discussed other treatment options including Prolia injections and Forteo,  which he declined with last visit    Continue the Protonix and Pepcid for GERD.  Digestive health once to schedule EGD to evaluate the globus sensation, but he sounds skeptical.       Continue the chronic management of apparent chronic allergy symptoms and postnasal drip.  Dr. Terry had him temporarily hold all medications in preparation for allergy testing.  Keep follow-up appointment with Dr. Terry.    Blood pressure is well controlled with current multidrug therapy.    Return in January for routine follow up with fasting labs one week prior or sooner if needed.     Scribed for Dr. Sainz by Lorraine Patel [facility].      Follow Up   Return if symptoms worsen or fail to improve, for Next scheduled follow up.  Patient was given instructions and counseling regarding his condition or for health maintenance advice. Please see specific information pulled into the AVS if appropriate.

## 2023-09-06 ENCOUNTER — OFFICE VISIT (OUTPATIENT)
Dept: FAMILY MEDICINE CLINIC | Facility: CLINIC | Age: 83
End: 2023-09-06
Payer: MEDICARE

## 2023-09-06 VITALS
BODY MASS INDEX: 23.86 KG/M2 | SYSTOLIC BLOOD PRESSURE: 122 MMHG | WEIGHT: 180 LBS | TEMPERATURE: 97.8 F | DIASTOLIC BLOOD PRESSURE: 80 MMHG | HEIGHT: 73 IN | HEART RATE: 76 BPM | OXYGEN SATURATION: 99 %

## 2023-09-06 DIAGNOSIS — G89.29 CHRONIC LEFT-SIDED LOW BACK PAIN WITH LEFT-SIDED SCIATICA: Chronic | ICD-10-CM

## 2023-09-06 DIAGNOSIS — S32.000S CLOSED WEDGE FRACTURE OF LUMBAR VERTEBRA, UNSPECIFIED LUMBAR VERTEBRAL LEVEL, SEQUELA: Chronic | ICD-10-CM

## 2023-09-06 DIAGNOSIS — J30.89 PERENNIAL ALLERGIC RHINITIS: Chronic | ICD-10-CM

## 2023-09-06 DIAGNOSIS — R51.9 SINUS HEADACHE: ICD-10-CM

## 2023-09-06 DIAGNOSIS — T88.7XXA MEDICATION SIDE EFFECT: ICD-10-CM

## 2023-09-06 DIAGNOSIS — M54.42 CHRONIC LEFT-SIDED LOW BACK PAIN WITH LEFT-SIDED SCIATICA: Chronic | ICD-10-CM

## 2023-09-06 DIAGNOSIS — H69.82 DYSFUNCTION OF LEFT EUSTACHIAN TUBE: Chronic | ICD-10-CM

## 2023-09-06 DIAGNOSIS — R53.83 FATIGUE, UNSPECIFIED TYPE: ICD-10-CM

## 2023-09-06 DIAGNOSIS — R09.82 POSTNASAL DRIP: Chronic | ICD-10-CM

## 2023-09-06 DIAGNOSIS — J31.0 CHRONIC RHINITIS: Primary | Chronic | ICD-10-CM

## 2023-09-06 DIAGNOSIS — R09.89 GLOBUS SENSATION: ICD-10-CM

## 2023-09-06 DIAGNOSIS — M81.0 AGE-RELATED OSTEOPOROSIS WITHOUT CURRENT PATHOLOGICAL FRACTURE: Chronic | ICD-10-CM

## 2023-09-06 RX ORDER — AZELASTINE 1 MG/ML
2 SPRAY, METERED NASAL 2 TIMES DAILY
Qty: 30 ML | Refills: 11 | Status: SHIPPED | OUTPATIENT
Start: 2023-09-06

## 2023-09-06 NOTE — PROGRESS NOTES
"Chief Complaint  Headache (X 1 week), Cough (Drainage in throat and coughing as he usually does. Normal for him), Fatigue, and ear problems (Left and is under the care of Dr. Garcia )    Subjective        History of Present Illness    Corey Maki presents to the office today with chronic rhinitis as well as persistent globus sensation.  He has had a frontal headache and fatigue this past week in addition to his chronic nonproductive cough and persistent symptoms of left eustachian tube dysfunction. He feels the headache may be related to vision.  He has changed glasses three times this year.  Dr. Abdulaziz De La Torre, pulmonology, follows pulmonary fibrosis.  Dr. Terry continues management of apparent chronic allergy symptoms and postnasal drip and did allergy testing revealing dust mite allergy, for which he recommended air purifier and using a daily antihistamine. Patient states he is currently taking both  Zyrtec and Allegra each evening, likely worsening thick secretions, which could be making his symptoms of globus sensation worse.  Patient also has carpet in his bedroom, which most likely makes symptoms worse. Patient feels the Astelin prescribed by Dr. Garcia seems to give him the best relief of symptoms of nasal congestion, which has not been as bad recently.  Patient states Dr. Garcia did not have much to offer to address the chronic left ear pressure.       He continues to have persistent chronic low back pain, although, remains active for his age of 83.         Patient has an appointment with Dr. Fabian Cooper on the 19th of this month to get a second opinion regarding treatment of his untreated osteoporosis.  He has been declining treatment, concerned about risk versus benefit.  See prior notes for more details    Objective   Vital Signs:  /80   Pulse 76   Temp 97.8 °F (36.6 °C)   Ht 185.4 cm (73\")   Wt 81.6 kg (180 lb)   SpO2 99%   BMI 23.75 kg/m²   Estimated body mass index is 23.75 kg/m² as " "calculated from the following:    Height as of this encounter: 185.4 cm (73\").    Weight as of this encounter: 81.6 kg (180 lb).       BMI is within normal parameters. No other follow-up for BMI required.      Physical Exam  Vitals reviewed.   Constitutional:       General: He is not in acute distress.     Appearance: He is well-developed.   HENT:      Head: Normocephalic and atraumatic.      Nose: Congestion present.      Right Sinus: No maxillary sinus tenderness or frontal sinus tenderness.      Left Sinus: No maxillary sinus tenderness or frontal sinus tenderness.      Mouth/Throat:      Mouth: No oral lesions.      Pharynx: Uvula midline.      Tonsils: No tonsillar exudate.      Comments: Prominent clear postnasal drip with cobblestoning  Eyes:      Conjunctiva/sclera: Conjunctivae normal.      Pupils: Pupils are equal, round, and reactive to light.   Neck:      Thyroid: No thyroid mass or thyromegaly.      Vascular: No carotid bruit or JVD.      Trachea: Trachea normal. No tracheal deviation.   Cardiovascular:      Rate and Rhythm: Normal rate and regular rhythm. No extrasystoles are present.     Chest Wall: PMI is not displaced.      Heart sounds: Normal heart sounds. No murmur heard.  Pulmonary:      Effort: Pulmonary effort is normal. No accessory muscle usage or respiratory distress.      Breath sounds: Normal breath sounds. No decreased breath sounds, wheezing, rhonchi or rales.      Comments: Chronic interstitial lung sounds.   Abdominal:      General: Bowel sounds are normal. There is no distension.      Palpations: Abdomen is soft.      Tenderness: There is no abdominal tenderness.   Musculoskeletal:      Cervical back: Neck supple.   Lymphadenopathy:      Cervical: No cervical adenopathy.   Skin:     General: Skin is warm and dry.      Findings: No rash.      Nails: There is no clubbing.   Neurological:      Mental Status: He is alert and oriented to person, place, and time. Mental status is at " baseline.      Cranial Nerves: No cranial nerve deficit.      Coordination: Coordination normal.   Psychiatric:         Mood and Affect: Mood normal.         Speech: Speech normal.         Behavior: Behavior normal.         Thought Content: Thought content normal.         Judgment: Judgment normal.          Result Review :      Data reviewed : Consultant notes allergy/immunology and pulmonology notes    Future Appointments   Date Time Provider Department Center   9/19/2023 11:00 AM Lukas Santos MD [facility]   1/22/2024 10:30 AM Keegan Sainz MD [facility]               Assessment and Plan   Diagnoses and all orders for this visit:    1. Chronic rhinitis (Primary)    2. Dysfunction of left eustachian tube    3. Postnasal drip -chronic    4. Chronic left-sided low back pain with left-sided sciatica    5. Closed wedge fracture of lumbar vertebra, unspecified lumbar vertebral level, sequela    6. Age-related osteoporosis without current pathological fracture    7. Fatigue, unspecified type    8. Sinus headache    9. Medication side effect    10. Globus sensation    11. Perennial allergic rhinitis - Dust mite. Dr Terry    Other orders  -     azelastine (ASTELIN) 0.1 % nasal spray; 2 sprays into the nostril(s) as directed by provider 2 (Two) Times a Day. Use in each nostril as directed  Dispense: 30 mL; Refill: 11           I spent 32 minutes caring for Corey on this date of service. This time includes time spent by me in the following activities:preparing for the visit, reviewing tests, obtaining and/or reviewing a separately obtained history, performing a medically appropriate examination and/or evaluation , counseling and educating the patient/family/caregiver, ordering medications, tests, or procedures, and documenting information in the medical record    I encouraged patient to continue the Flonase nasal spray one spray each nostril twice daily and add Astelin nasal spray two spray twice daily  during peak allergy season.  Dr. Terry had mentioned this as the next step to try in this patient.  Continue the sinus irrigation 1-2 times daily.   He states he is taking both Allegra or Zyrtec.  I told him the combination may be making the globus sensation worse due to excessively thick secretions and recommended he only take one antihistamine and may want to switch to Allegra, as the Zyrtec could be contributing to his fatigue symptoms.  I considered giving him a decongestant to take to help with sinus headaches and his current pressure sensation of the frontal sinus and left ear, but decided against doing so due to current high antihistamine use and the possibility that adding a decongestant might result in urinary retention.  I did emphasize the importance of sinus irrigation daily with Ruby bottle    Continue to use air purifier to address the dust mite allergy.  I did suggest he consider removing the carpet from his bedroom floor, which could be making his symptoms worse.  Continue visits with Dr. Terry, allergist, as scheduled and Dr. De La Torre, pulmonology to follow pulmonary fibrosis.  PFTs were surprisingly good in comparison to his imaging and lung exam    Return 01/22/2024 for routine follow up with fasting labs one week prior or sooner if needed.      Scribed for Dr. Sainz by Lorraine Patel [facility].      Follow Up   Return if symptoms worsen or fail to improve, for Next scheduled follow up.  Patient was given instructions and counseling regarding his condition or for health maintenance advice. Please see specific information pulled into the AVS if appropriate.

## 2023-09-07 PROBLEM — Z91.09 HOUSE DUST MITE ALLERGY: Chronic | Status: ACTIVE | Noted: 2023-09-07

## 2023-09-07 PROBLEM — Z91.09 HOUSE DUST MITE ALLERGY: Status: ACTIVE | Noted: 2023-09-07

## 2023-09-19 ENCOUNTER — OFFICE VISIT (OUTPATIENT)
Dept: ENDOCRINOLOGY | Facility: CLINIC | Age: 83
End: 2023-09-19
Payer: COMMERCIAL

## 2023-09-19 VITALS
BODY MASS INDEX: 25.34 KG/M2 | WEIGHT: 181 LBS | DIASTOLIC BLOOD PRESSURE: 60 MMHG | HEART RATE: 50 BPM | OXYGEN SATURATION: 100 % | HEIGHT: 71 IN | SYSTOLIC BLOOD PRESSURE: 120 MMHG

## 2023-09-19 DIAGNOSIS — M80.80XA LOCALIZED OSTEOPOROSIS WITH CURRENT PATHOLOGICAL FRACTURE, INITIAL ENCOUNTER: Primary | ICD-10-CM

## 2023-09-19 PROCEDURE — 99204 OFFICE O/P NEW MOD 45 MIN: CPT | Performed by: INTERNAL MEDICINE

## 2023-09-19 NOTE — PROGRESS NOTES
"Chief Complaint   Patient presents with    Osteoporosis         History of Present Illness    83 y.o. male     Osteoporosis with vertebral fractures  Femoral Neck T score Jan 2022 , -2.8     Took bisphosphonates for a short period but read side effects and not interested in taking    He is more interested in doing exercises that improve his basal and strength.   His main complain is morning stiffness and right sided hip pain upon standing.   He is walking with assistance of a cane.       ==========================================  Physical Exam  /60   Pulse 50   Ht 180.3 cm (71\")   Wt 82.1 kg (181 lb)   SpO2 100%   BMI 25.24 kg/m²   AOx3  No Goiter , no carotid bruit  RRR  CTA  No Edema     ==========================================    Laboratory Workup    Lab Results   Component Value Date    WBC 7.09 01/12/2023    HGB 14.7 01/12/2023    HCT 43.7 01/12/2023    MCV 89.4 01/12/2023     01/12/2023       Lab Results   Component Value Date    GLUCOSE 100 (H) 01/12/2023    BUN 18 01/12/2023    CREATININE 1.11 01/12/2023    EGFR 66.3 01/12/2023    BCR 16.2 01/12/2023     01/12/2023    K 3.9 01/12/2023     01/12/2023    CO2 28.0 01/12/2023    CALCIUM 9.7 01/12/2023    ANIONGAP 10.0 01/12/2023    ALBUMIN 4.2 01/12/2023    AST 21 01/12/2023    ALT 16 01/12/2023         Lab Results   Component Value Date    EGFR 66.3 01/12/2023    EGFR 67.4 03/07/2022   COMPARISON:  07/26/2021.     FINDINGS:  There are chronic compression deformities at the L1 and L2 levels.     There is a compression deformity of the superior endplate of L3, which has  slightly increased compared to patient's prior examination.     There is loss of normal intervertebral disc space height at all lumbar levels.  The visualized bones are demineralized.      ==========================================      ICD-10-CM ICD-9-CM   1. Localized osteoporosis with current pathological fracture, initial encounter  M80.80XA 733.09     733.10 "     Severe Osteoporosis    He is a candidate for anabolic therapy but elects to not use it.     He takes vitamin D.  He drinks almond milk    I suggested to take calcium citrate to achieve a daily dose of 800 to 1200 mg daily     I will ask Dr. Sainz if he would be kind enough to refer to physical therapy       No orders of the defined types were placed in this encounter.    No orders of the defined types were placed in this encounter.              This document has been electronically signed by Lukas Cooper MD on September 19, 2023 11:33 CDT

## 2023-09-20 ENCOUNTER — TELEPHONE (OUTPATIENT)
Dept: FAMILY MEDICINE CLINIC | Facility: CLINIC | Age: 83
End: 2023-09-20
Payer: COMMERCIAL

## 2023-09-20 DIAGNOSIS — M25.60 JOINT STIFFNESS: ICD-10-CM

## 2023-09-20 DIAGNOSIS — M25.551 BILATERAL HIP PAIN: Primary | ICD-10-CM

## 2023-09-20 DIAGNOSIS — M25.552 BILATERAL HIP PAIN: Primary | ICD-10-CM

## 2023-09-20 NOTE — TELEPHONE ENCOUNTER
----- Message from Keegan Sainz MD sent at 9/19/2023  4:12 PM CDT -----  Refer to physical therapy of choice to evaluate and treat arthritic hip pain and arthritic stiffness.  Per patient request and recommended by Dr. Fabian Cooper.  EB  ----- Message -----  From: Lukas Santos MD  Sent: 9/19/2023  12:33 PM CDT  To: Keegan Sainz MD